# Patient Record
Sex: MALE | Race: WHITE | NOT HISPANIC OR LATINO | Employment: OTHER | RURAL
[De-identification: names, ages, dates, MRNs, and addresses within clinical notes are randomized per-mention and may not be internally consistent; named-entity substitution may affect disease eponyms.]

---

## 2020-09-09 ENCOUNTER — TRANSCRIBE ORDERS (OUTPATIENT)
Dept: PHYSICAL THERAPY | Facility: CLINIC | Age: 80
End: 2020-09-09

## 2020-09-09 DIAGNOSIS — M51.36 DDD (DEGENERATIVE DISC DISEASE), LUMBAR: ICD-10-CM

## 2020-09-09 DIAGNOSIS — M54.50 LOW BACK PAIN, UNSPECIFIED BACK PAIN LATERALITY, UNSPECIFIED CHRONICITY, UNSPECIFIED WHETHER SCIATICA PRESENT: Primary | ICD-10-CM

## 2020-09-10 ENCOUNTER — TREATMENT (OUTPATIENT)
Dept: PHYSICAL THERAPY | Facility: CLINIC | Age: 80
End: 2020-09-10

## 2020-09-10 DIAGNOSIS — M51.36 DDD (DEGENERATIVE DISC DISEASE), LUMBAR: Primary | ICD-10-CM

## 2020-09-10 PROCEDURE — 97161 PT EVAL LOW COMPLEX 20 MIN: CPT | Performed by: PHYSICAL THERAPIST

## 2020-09-10 PROCEDURE — 97110 THERAPEUTIC EXERCISES: CPT | Performed by: PHYSICAL THERAPIST

## 2020-09-10 PROCEDURE — 97140 MANUAL THERAPY 1/> REGIONS: CPT | Performed by: PHYSICAL THERAPIST

## 2020-09-10 NOTE — PROGRESS NOTES
Physical Therapy Initial Evaluation and Plan of Care        Patient: Amol Hudson   : 1940  Diagnosis/ICD-10 Code:  DDD (degenerative disc disease), lumbar [M51.36]  Referring practitioner: NAOMI Peterson  Date of Initial Visit: 9/10/2020  Today's Date: 9/10/2020  Patient seen for 1 sessions  REcert 10/1/20  MD PRN         Subjective Questionnaire: Oswestry:       Subjective Evaluation    History of Present Illness  Onset date: Greater than 10 years.    Subjective comment: Chronic long-term issues with chiropractic use.  Not staying stable after adjustments now.  Decreased fitness and core strength without fitness gym  Patient Occupation: Retired / golfing Quality of life: good    Pain  Current pain ratin  At best pain ratin  At worst pain ratin  Location: Right side low back  Quality: dull ache, tight and pulling  Relieving factors: rest  Aggravating factors: standing, prolonged positioning, ambulation and lifting  Progression: worsening    Social Support  Lives with: spouse    Hand dominance: right    Diagnostic Tests  X-ray: abnormal    Treatments  Previous treatment: chiropractic  Patient Goals  Patient goals for therapy: decreased pain, increased motion, increased strength and return to sport/leisure activities             Objective   Patient presents today with nonantalgic gait.  Lumbar flexion is 8 inches from the floor extension 30 degrees with pain in the right SI lumbar region.  Lateral flexion 2 inch deficit from the lateral joint lines  Manual muscle testing hip flexors 4+, abductors 4, quadriceps 5, hamstrings 5, plantar flexors dorsiflexors 5.  Sensation intact to crude light touch  Negative straight leg raise, positive Ynes's on the right, negative Buddy's  Is a right leg length discrepancy approximately 1/4-3/8 of an inch and posterior right sacral base and rotations of the lumbar's to the right.    No tenderness in the piriformis or sciatic notch noted.  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  Patient has SI dysfunction.  He would benefit from skilled therapy intervention for manual therapy home exercise program and core stabilization activities  Prognosis: good  Functional Limitations: lifting, uncomfortable because of pain, standing and stooping  Goals  Plan Goals: GOALS:  1. Lumbar flexion to the toes  2. Lumbar extension 35  evenly right and left  3. Lumbar SB 1\" right LJL  4. Lumbar SB 1\" left LJL  5. Pain   5 on numerical analogue scale or better in the mornings  6. Modified Oswestry  Less than 20%  7. Independent in HEP     Plan  Therapy options: will be seen for skilled physical therapy services  Planned modality interventions: cryotherapy  Planned therapy interventions: manual therapy, abdominal trunk stabilization, strengthening, stretching and home exercise program  Duration in visits: 12  Treatment plan discussed with: patient  Plan details: Manual therapy joint mobilization with muscle energy techniques to the SI and lumbar region.  Core stabilization strengthening of hips and lower extremity.  May use ice for inflammation      Visit Diagnoses:    ICD-10-CM ICD-9-CM   1. DDD (degenerative disc disease), lumbar M51.36 722.52       Timed:  Manual Therapy:    13     mins  80921;  Therapeutic Exercise:      17   mins  65986;     Neuromuscular Tarun:        mins  21999;    Therapeutic Activity:          mins  72387;     Gait Training:           mins  23239;     Ultrasound:          mins  97211;    Electrical Stimulation:         mins  52544 ( );    Untimed:  Electrical Stimulation:         mins  56787 ( );  Mechanical Traction:         mins  92320; "     Timed Treatment:   30   mins   Total Treatment:     30   mins    PT SIGNATURE: Nancy Barnes, JO DPT   DATE TREATMENT INITIATED: 9/10/2020    Initial Certification Period: 12/9/2020  I certify that the therapy services are furnished while this patient is under my care.  The services outlined above are required by this patient, and will be reviewed every 90 days.     PHYSICIAN: Jay Jay Beth, APRN      DATE:     Please sign and return via fax to [phone].. Thank you, [facility].

## 2020-09-14 ENCOUNTER — TREATMENT (OUTPATIENT)
Dept: PHYSICAL THERAPY | Facility: CLINIC | Age: 80
End: 2020-09-14

## 2020-09-14 DIAGNOSIS — M51.36 DDD (DEGENERATIVE DISC DISEASE), LUMBAR: Primary | ICD-10-CM

## 2020-09-14 PROCEDURE — 97110 THERAPEUTIC EXERCISES: CPT | Performed by: PHYSICAL THERAPIST

## 2020-09-14 PROCEDURE — 97140 MANUAL THERAPY 1/> REGIONS: CPT | Performed by: PHYSICAL THERAPIST

## 2020-09-14 NOTE — PROGRESS NOTES
"   Physical Therapy Daily Progress Note      Patient: Amol Hudson   : 1940  Referring practitioner: NAOMI Peterson  Date of Initial Visit: Type: THERAPY  Noted: 9/10/2020  Today's Date: 2020  Patient seen for 2 sessions  Recert 10/1  MD PALACIO     Amol Hudson reports: Pain is worse in the morning.  Better since eval visit last week. Exercises OK Pain 2/10 this  Morning now.  First up and out of bet is worse  Subjective Questionnaire: Oswestry: 22%      Subjective     Objective   See Exercise, Manual, and Modality Logs for complete treatment.   No LLD, decreased TTP of SI joint    Assessment/Plan  Goals  Plan Goals: GOALS:  1. Lumbar flexion to the toes  2. Lumbar extension 35  evenly right and left  3. Lumbar SB 1\" right LJL  4. Lumbar SB 1\" left LJL  5. Pain   5 on numerical analogue scale or better in the mornings  6. Modified Oswestry  Less than 20%  7. Independent in HEP   Visit Diagnoses:    ICD-10-CM ICD-9-CM   1. DDD (degenerative disc disease), lumbar  M51.36 722.52       Progress per Plan of Care and Progress strengthening /stabilization /functional activity           Timed:  Manual Therapy:    15     mins  32138;  Therapeutic Exercise:    32     mins  85716;     Neuromuscular Tarun:        mins  87530;    Therapeutic Activity:          mins  01927;     Gait Training:           mins  89564;     Ultrasound:          mins  21580;    Electrical Stimulation:         mins  04967 ( );    Untimed:  Electrical Stimulation:         mins  92671 ( );  Mechanical Traction:         mins  40806;     Timed Treatment: 47     mins   Total Treatment:    47   mins  Nancy Barnes, PT DPT  Physical Therapist                  "

## 2020-09-17 ENCOUNTER — TREATMENT (OUTPATIENT)
Dept: PHYSICAL THERAPY | Facility: CLINIC | Age: 80
End: 2020-09-17

## 2020-09-17 DIAGNOSIS — M51.36 DDD (DEGENERATIVE DISC DISEASE), LUMBAR: Primary | ICD-10-CM

## 2020-09-17 PROCEDURE — 97140 MANUAL THERAPY 1/> REGIONS: CPT | Performed by: PHYSICAL THERAPIST

## 2020-09-17 PROCEDURE — 97110 THERAPEUTIC EXERCISES: CPT | Performed by: PHYSICAL THERAPIST

## 2020-09-17 NOTE — PROGRESS NOTES
"   Physical Therapy Daily Progress Note      Patient: Amol Hudson   : 1940  Referring practitioner: NAOMI Peterson  Date of Initial Visit: Type: THERAPY  Noted: 9/10/2020  Today's Date: 2020  Patient seen for 3 sessions  REcert 10/1/20  MD PALACIO       Amol Hudson reports: Seems to get moving better in the  Mornings but still very tight and painful when first gets up. Rarely stretches before getting out of bed.  Not as much pain the day after golfing as there was  Subjective Questionnaire:       Subjective     Objective   See Exercise, Manual, and Modality Logs for complete treatment.   Tight hamstrings.    Assessment/Plan  Goals  Plan Goals: GOALS:  1. Lumbar flexion to the toes  2. Lumbar extension 35  evenly right and left  3. Lumbar SB 1\" right LJL  4. Lumbar SB 1\" left LJL  5. Pain   5 on numerical analogue scale or better in the mornings  6. Modified Oswestry  Less than 20%  7. Independent in HEP     Visit Diagnoses:    ICD-10-CM ICD-9-CM   1. DDD (degenerative disc disease), lumbar  M51.36 722.52       Progress per Plan of Care and Progress strengthening /stabilization /functional activity           Timed:  Manual Therapy:   12      mins  79535;  Therapeutic Exercise:    38     mins  36836;     Neuromuscular Tarun:        mins  75323;    Therapeutic Activity:          mins  15503;     Gait Training:           mins  15276;     Ultrasound:          mins  15462;    Electrical Stimulation:         mins  82884 ( );    Untimed:  Electrical Stimulation:         mins  28844 ( );  Mechanical Traction:         mins  02892;     Timed Treatment:   50   mins   Total Treatment:    50    mins  Nancy Barnes, PT DPT  Physical Therapist                  "

## 2020-09-22 ENCOUNTER — TREATMENT (OUTPATIENT)
Dept: PHYSICAL THERAPY | Facility: CLINIC | Age: 80
End: 2020-09-22

## 2020-09-22 DIAGNOSIS — M51.36 DDD (DEGENERATIVE DISC DISEASE), LUMBAR: Primary | ICD-10-CM

## 2020-09-22 PROCEDURE — 97140 MANUAL THERAPY 1/> REGIONS: CPT | Performed by: PHYSICAL THERAPIST

## 2020-09-22 PROCEDURE — 97110 THERAPEUTIC EXERCISES: CPT | Performed by: PHYSICAL THERAPIST

## 2020-09-22 NOTE — PROGRESS NOTES
"   Physical Therapy Daily Progress Note      Patient: Amol Hudson   : 1940  Referring practitioner: NAOMI Peterson  Date of Initial Visit: Type: THERAPY  Noted: 9/10/2020  Today's Date: 2020  Patient seen for 4 sessions  RECERT   MD PALACIO       Amol Hudson reports: 0 pain this morning. Much better transition getting up out of bed.  Overall 50% improved  Subjective Questionnaire:       Subjective     Objective   See Exercise, Manual, and Modality Logs for complete treatment.   Level sacral base and no LLD. Hamstrings still tight with seated knee extension.  Tight hip flexors. TTP right and left lumbar paraspinals  Assessment/Plan  Goals  Plan Goals: GOALS:  1. Lumbar flexion to the toes  2. Lumbar extension 35  evenly right and left  3. Lumbar SB 1\" right LJL  4. Lumbar SB 1\" left LJL  5. Pain   5 on numerical analogue scale or better in the mornings  6. Modified Oswestry  Less than 20%  7. Independent in HEP   Visit Diagnoses:    ICD-10-CM ICD-9-CM   1. DDD (degenerative disc disease), lumbar  M51.36 722.52     Pt states he is not available to schedule 10/1 due to other MD appointments and obligations  Progress per Plan of Care and Progress strengthening /stabilization /functional activity           Timed:  Manual Therapy: 10     mins  29732;  Therapeutic Exercise:    45   mins  44494;     Neuromuscular Tarun:        mins  22295;    Therapeutic Activity:          mins  80145;     Gait Training:           mins  62895;     Ultrasound:          mins  65439;    Electrical Stimulation:         mins  06457 ( );    Untimed:  Electrical Stimulation:         mins  58279 ( );  Mechanical Traction:         mins  47586;     Timed Treatment:  55    mins   Total Treatment:   55     mins  Nancy Barnes, PT DPT  Physical Therapist                  "

## 2020-09-24 ENCOUNTER — TREATMENT (OUTPATIENT)
Dept: PHYSICAL THERAPY | Facility: CLINIC | Age: 80
End: 2020-09-24

## 2020-09-24 DIAGNOSIS — M51.36 DDD (DEGENERATIVE DISC DISEASE), LUMBAR: Primary | ICD-10-CM

## 2020-09-24 PROCEDURE — 97110 THERAPEUTIC EXERCISES: CPT | Performed by: PHYSICAL THERAPIST

## 2020-09-24 PROCEDURE — 97140 MANUAL THERAPY 1/> REGIONS: CPT | Performed by: PHYSICAL THERAPIST

## 2020-09-24 NOTE — PROGRESS NOTES
"   Physical Therapy Daily Progress Note      Patient: Amol Hudson   : 1940  Referring practitioner: NAOMI Peterson  Date of Initial Visit: Type: THERAPY  Noted: 9/10/2020  Today's Date: 2020  Patient seen for 5 sessions  Recert   MD PALACIO       Amol Hudson reports: Rode in car a lot on Wednesday, sat around on couch a lot yesterday,  Did just a few stretches last night.  Sharp pain when getting up this morning.  Better now.  Subjective Questionnaire:       Subjective     Objective   See Exercise, Manual, and Modality Logs for complete treatment.   Level sacral base. TTP bilateral lumbar paraspinals    Assessment/Plan  Goals  Plan Goals: GOALS:  1. Lumbar flexion to the toes  2. Lumbar extension 35  evenly right and left  3. Lumbar SB 1\" right LJL  4. Lumbar SB 1\" left LJL  5. Pain   5 on numerical analogue scale or better in the mornings  6. Modified Oswestry  Less than 20%  7. Independent in HEP     Visit Diagnoses:    ICD-10-CM ICD-9-CM   1. DDD (degenerative disc disease), lumbar  M51.36 722.52       Progress strengthening /stabilization /functional activity           Timed:  Manual Therapy:    12     mins  41014;  Therapeutic Exercise:    35     mins  07483;     Neuromuscular Tarun:        mins  65225;    Therapeutic Activity:          mins  77308;     Gait Training:           mins  42037;     Ultrasound:          mins  17443;    Electrical Stimulation:         mins  83540 ( );    Untimed:  Electrical Stimulation:         mins  93397 ( );  Mechanical Traction:         mins  84255;     Timed Treatment: 47     mins   Total Treatment:      47  mins  Nancy Barnes, PT DPT  Physical Therapist                  "

## 2020-09-29 ENCOUNTER — TREATMENT (OUTPATIENT)
Dept: PHYSICAL THERAPY | Facility: CLINIC | Age: 80
End: 2020-09-29

## 2020-09-29 DIAGNOSIS — M51.36 DDD (DEGENERATIVE DISC DISEASE), LUMBAR: Primary | ICD-10-CM

## 2020-09-29 PROCEDURE — 97140 MANUAL THERAPY 1/> REGIONS: CPT | Performed by: PHYSICAL THERAPIST

## 2020-09-29 PROCEDURE — 97110 THERAPEUTIC EXERCISES: CPT | Performed by: PHYSICAL THERAPIST

## 2020-09-29 PROCEDURE — 97530 THERAPEUTIC ACTIVITIES: CPT | Performed by: PHYSICAL THERAPIST

## 2020-09-29 NOTE — PROGRESS NOTES
"Re-Assessment / Re-Certification        Patient: Amol Hudson   : 1940  Diagnosis/ICD-10 Code:  DDD (degenerative disc disease), lumbar [M51.36]  Referring practitioner: NAOMI Peterson  Date of Initial Visit: Type: THERAPY  Noted: 9/10/2020  Today's Date: 2020  Patient seen for 6 sessions  Recert 10/20  MD PRN    Subjective:   Amol Hudson reports: Better about 60%. Not as much morning pain 7/10 some mornings.  Subjective Questionnaire: Oswestry:  14% added 10/6/20  Clinical Progress: improved  Home Program Compliance: Yes  Treatment has included: therapeutic exercise, manual therapy and cryotherapy    Subjective   Objective   Lumbar flexion 2\" from toes, extension 35 with no SI blocking,  Lateral flexion 3\" from LJL  R with SI joint pain on the right, Left to LJL with no complaints of pain.  MMT hip flexion 5-/5. No LLD this date. TTP in lumbar paraspinals.  R sciatic notch/piriformis.  Normal gait pattern.   Assessment/Plan  Goals  Plan Goals: GOALS:  1. Lumbar flexion to the toes- Progressing  2. Lumbar extension 35  evenly right and left- met  3. Lumbar SB 1\" right LJL- progressing  4. Lumbar SB 1\" left LJL- MET  5. Pain   5 on numerical analogue scale or better in the mornings- NOT Met  6. Modified Oswestry  Less than 20%  7. Independent in HEP -progressing    Additional written HEP given this date  Visit Diagnoses:    ICD-10-CM ICD-9-CM   1. DDD (degenerative disc disease), lumbar  M51.36 722.52       Progress toward previous goals: Partially Met        Recommendations: Continue as planned  Timeframe: 3 weeks  Prognosis to achieve goals: good    PT Signature: Sarah Bowen, SUZIE Freed PT, ATC, DPT    Based upon review of the patient's progress and continued therapy plan, it is my medical opinion that Amol Hudson should continue physical therapy treatment at [facility]  [address] [facility] " [id number] [phone].    Signature: __________________________________  Jay Jay Beth APRN    Timed:  Manual Therapy:    10     mins  01276;  Therapeutic Exercise:  34       mins  12568;     Neuromuscular Tarun:        mins  90102;    Therapeutic Activity:    10      mins  93759;     Gait Training:           mins  90364;     Ultrasound:          mins  22005;    Electrical Stimulation:         mins  14593 ( );    Untimed:  Electrical Stimulation:         mins  08817 ( );  Mechanical Traction:         mins  79037;     Timed Treatment:  54    mins   Total Treatment:      54 mins

## 2020-10-06 ENCOUNTER — TREATMENT (OUTPATIENT)
Dept: PHYSICAL THERAPY | Facility: CLINIC | Age: 80
End: 2020-10-06

## 2020-10-06 DIAGNOSIS — M51.36 DDD (DEGENERATIVE DISC DISEASE), LUMBAR: Primary | ICD-10-CM

## 2020-10-06 PROCEDURE — 97110 THERAPEUTIC EXERCISES: CPT | Performed by: PHYSICAL THERAPIST

## 2020-10-06 NOTE — PROGRESS NOTES
"   Physical Therapy Daily Progress Note      Patient: Amol Hudson   : 1940  Referring practitioner: NAOMI Peterson  Date of Initial Visit: Type: THERAPY  Noted: 9/10/2020  Today's Date: 10/6/2020  Patient seen for 7 sessions    Recert due:  10-20-20  MD followup:  zayda Hudson reports:  60% improvement  Subjective Questionnaire:       Subjective   Reports his usual morning stiffness and pain.  Did weed eating yesterday.  Pre RX pain 4/10.     Objective   See Exercise, Manual, and Modality Logs for complete treatment.       Assessment & Plan     Assessment  Assessment details: Pt attempted obliques on ab crunch Cybex, but had pain increase and stopped that.  Tolerated all other therex well and reported pain decrease post RX.      Goals  Plan Goals: Plan Goals: GOALS:  1. Lumbar flexion to the toes- Progressing  2. Lumbar extension 35  evenly right and left- met  3. Lumbar SB 1\" right LJL- progressing  4. Lumbar SB 1\" left LJL- MET  5. Pain   5 on numerical analogue scale or better in the mornings- NOT Met  6. Modified Oswestry  Less than 20%  7. Independent in HEP -progressing    Plan  Plan details: Box lift with proper body mechanics        Visit Diagnoses:    ICD-10-CM ICD-9-CM   1. DDD (degenerative disc disease), lumbar  M51.36 722.52                  Timed:  Manual Therapy:         mins  06667;  Therapeutic Exercise:   42      mins  87120;     Neuromuscular Tarun:        mins  87751;    Therapeutic Activity:          mins  52538;     Gait Training:           mins  19588;     Ultrasound:          mins  21100;    Electrical Stimulation:         mins  47228 ( );    Untimed:  Electrical Stimulation:         mins  16229 ( );  Mechanical Traction:         mins  91084;     Timed Treatment:   42   mins   Total Treatment:    42    mins  Sarah Bowen PTA  Physical Therapist                  "

## 2020-10-08 ENCOUNTER — TREATMENT (OUTPATIENT)
Dept: PHYSICAL THERAPY | Facility: CLINIC | Age: 80
End: 2020-10-08

## 2020-10-08 DIAGNOSIS — M51.36 DDD (DEGENERATIVE DISC DISEASE), LUMBAR: Primary | ICD-10-CM

## 2020-10-08 PROCEDURE — 97110 THERAPEUTIC EXERCISES: CPT | Performed by: PHYSICAL THERAPIST

## 2020-10-08 PROCEDURE — 97530 THERAPEUTIC ACTIVITIES: CPT | Performed by: PHYSICAL THERAPIST

## 2020-10-08 PROCEDURE — 97140 MANUAL THERAPY 1/> REGIONS: CPT | Performed by: PHYSICAL THERAPIST

## 2020-10-08 NOTE — PROGRESS NOTES
"   Physical Therapy Daily Progress Note      Patient: Amol Hudson   : 1940  Referring practitioner: NAOMI Peterson  Date of Initial Visit: Type: THERAPY  Noted: 9/10/2020  Today's Date: 10/8/2020  Patient seen for 8 sessions  REassess: 10/20/20  MD PALACIO       Amol Hudson reports: Played golf yesterday and back is really sore with right shoulder pain like the bursitis he had before  Subjective Questionnaire:       Subjective     Objective   See Exercise, Manual, and Modality Logs for complete treatment.   SI  TTP on the right. Doing well with gait. Lumbar rotations to the right.  TTP in  Paraspinals in the lumbar and lower thoracic regions. Cavitation with  DAPHNE wing  Assessment/Plan  Goals  Plan Goals: Plan Goals: GOALS:  1. Lumbar flexion to the toes- Progressing  2. Lumbar extension 35  evenly right and left- met  3. Lumbar SB 1\" right LJL- progressing  4. Lumbar SB 1\" left LJL- MET  5. Pain   5 on numerical analogue scale or better in the mornings- NOT Met  6. Modified Oswestry  Less than 20%  7. Independent in HEP -progressing  Visit Diagnoses:    ICD-10-CM ICD-9-CM   1. DDD (degenerative disc disease), lumbar  M51.36 722.52       Progress per Plan of Care and Progress strengthening /stabilization /functional activity         Timed Treatment:  48    mins   Total Treatment:    48    mins  Nancy Barnes, PT DPT  Physical Therapist                  "

## 2020-10-13 ENCOUNTER — TREATMENT (OUTPATIENT)
Dept: PHYSICAL THERAPY | Facility: CLINIC | Age: 80
End: 2020-10-13

## 2020-10-13 DIAGNOSIS — M51.36 DDD (DEGENERATIVE DISC DISEASE), LUMBAR: Primary | ICD-10-CM

## 2020-10-13 PROCEDURE — 97110 THERAPEUTIC EXERCISES: CPT | Performed by: PHYSICAL THERAPIST

## 2020-10-13 PROCEDURE — 97530 THERAPEUTIC ACTIVITIES: CPT | Performed by: PHYSICAL THERAPIST

## 2020-10-13 PROCEDURE — 97140 MANUAL THERAPY 1/> REGIONS: CPT | Performed by: PHYSICAL THERAPIST

## 2020-10-13 NOTE — PROGRESS NOTES
"   Physical Therapy Daily Progress Note      Patient: Amol Hudson   : 1940  Referring practitioner: NAOMI Peterson  Date of Initial Visit: Type: THERAPY  Noted: 9/10/2020  Today's Date: 10/13/2020  Patient seen for 9 sessions  Reassess 10/20  MD PALACIO       Amol Hudson reports: doing OK today  Subjective Questionnaire:       Subjective     Objective   See Exercise, Manual, and Modality Logs for complete treatment.   Sit to stands, steps for function.    Assessment/Plan  Goals  Plan Goals: Plan Goals: GOALS:  1. Lumbar flexion to the toes- Progressing  2. Lumbar extension 35  evenly right and left- met  3. Lumbar SB 1\" right LJL- progressing  4. Lumbar SB 1\" left LJL- MET  5. Pain   5 on numerical analogue scale or better in the mornings- NOT Met  6. Modified Oswestry  Less than 20%  7. Independent in HEP -progressing    Visit Diagnoses:    ICD-10-CM ICD-9-CM   1. DDD (degenerative disc disease), lumbar  M51.36 722.52       Progress per Plan of Care and Progress strengthening /stabilization /functional activity           Timed:  Manual Therapy:    12     mins  69457;  Therapeutic Exercise:    18     mins  46281;     Neuromuscular Tarun:        mins  05961;    Therapeutic Activity:    12      mins  47637;     Gait Training:           mins  08142;     Ultrasound:          mins  42849;    Electrical Stimulation:         mins  53501 ( );    Untimed:  Electrical Stimulation:         mins  17031 ( );  Mechanical Traction:         mins  02899;     Timed Treatment:   42   mins   Total Treatment:   42     mins  Nancy Barnes, PT DPT  Physical Therapist                  "

## 2020-10-15 ENCOUNTER — TREATMENT (OUTPATIENT)
Dept: PHYSICAL THERAPY | Facility: CLINIC | Age: 80
End: 2020-10-15

## 2020-10-15 DIAGNOSIS — M51.36 DDD (DEGENERATIVE DISC DISEASE), LUMBAR: Primary | ICD-10-CM

## 2020-10-15 PROCEDURE — 97530 THERAPEUTIC ACTIVITIES: CPT | Performed by: PHYSICAL THERAPIST

## 2020-10-15 PROCEDURE — 97110 THERAPEUTIC EXERCISES: CPT | Performed by: PHYSICAL THERAPIST

## 2020-10-15 PROCEDURE — 97140 MANUAL THERAPY 1/> REGIONS: CPT | Performed by: PHYSICAL THERAPIST

## 2020-10-15 NOTE — PROGRESS NOTES
"   Physical Therapy Daily Progress Note      Patient: Amol Hudson   : 1940  Referring practitioner: NAOMI Peterson  Date of Initial Visit: Type: THERAPY  Noted: 9/10/2020  Today's Date: 10/15/2020  Patient seen for 10 sessions  recert  10/20  MD PALACIO       Amol Hudson reports: played golf yesterday, back is not too bad today  Subjective Questionnaire:       Subjective     Objective   See Exercise, Manual, and Modality Logs for complete treatment.       Assessment/Plan  Goals   GOALS:  1. Lumbar flexion to the toes- Progressing  2. Lumbar extension 35  evenly right and left- met  3. Lumbar SB 1\" right LJL- progressing  4. Lumbar SB 1\" left LJL- MET  5. Pain   5 on numerical analogue scale or better in the mornings- NOT Met  6. Modified Oswestry  Less than 20%  7. Independent in HEP -progressing     Visit Diagnoses:    ICD-10-CM ICD-9-CM   1. DDD (degenerative disc disease), lumbar  M51.36 722.52       Continue with core strength and manual therapy. REassess next week           Timed:  Manual Therapy:    15   mins  48017;  Therapeutic Exercise:   30      mins  88661;     Neuromuscular Tarun:        mins  75860;    Therapeutic Activity:     10     mins  69475;     Gait Training:           mins  49190;     Ultrasound:          mins  73670;    Electrical Stimulation:         mins  61327 ( );    Untimed:  Electrical Stimulation:         mins  89800 ( );  Mechanical Traction:        mins  24512;     Timed Treatment:    55mins   Total Treatment:    55    mins  Nancy Barnes, PT DPT  Physical Therapist                  "

## 2020-10-20 ENCOUNTER — TREATMENT (OUTPATIENT)
Dept: PHYSICAL THERAPY | Facility: CLINIC | Age: 80
End: 2020-10-20

## 2020-10-20 DIAGNOSIS — M51.36 DDD (DEGENERATIVE DISC DISEASE), LUMBAR: Primary | ICD-10-CM

## 2020-10-20 PROCEDURE — 97140 MANUAL THERAPY 1/> REGIONS: CPT | Performed by: PHYSICAL THERAPIST

## 2020-10-20 PROCEDURE — 97112 NEUROMUSCULAR REEDUCATION: CPT | Performed by: PHYSICAL THERAPIST

## 2020-10-20 PROCEDURE — 97110 THERAPEUTIC EXERCISES: CPT | Performed by: PHYSICAL THERAPIST

## 2020-10-20 NOTE — PROGRESS NOTES
"   Physical Therapy Daily Progress Note      Patient: Amol Hudson   : 1940  Referring practitioner: NAOMI Peterson  Date of Initial Visit: Type: THERAPY  Noted: 9/10/2020  Today's Date: 10/20/2020  Patient seen for 11 sessions    recert  10/20  MD PALACIO       Amol Hudson reports: 60% improvement        Subjective Evaluation    History of Present Illness    Subjective comment: pt states that he has no pain in his back today. States that he is going to see his PCP about his knee and his shoulder today.Pain  Current pain ratin           Objective          Postural Observations    Additional Postural Observation Details  R ASIS inferior to L. Corrected with R hip flexor/ L HS MET      See Exercise, Manual, and Modality Logs for complete treatment.       Assessment & Plan     Assessment  Assessment details: Pt does well with treatment. Minimal cues for cybex setup/ completion. Pt did require some cues for PN on pball and for not over extending leg on multifidus step ups. Good response to manual. Alignment corrected easily with MET.    Goals  Plan Goals: GOALS:  1. Lumbar flexion to the toes- Progressing  2. Lumbar extension 35  evenly right and left- met  3. Lumbar SB 1\" right LJL- progressing  4. Lumbar SB 1\" left LJL- MET  5. Pain   5 on numerical analogue scale or better in the mornings- NOT Met  6. Modified Oswestry  Less than 20%  7. Independent in HEP -progressing      Plan  Plan details: Possibly work on QP bird dogs. Modified planks. Recheck next visit.        Visit Diagnoses:    ICD-10-CM ICD-9-CM   1. DDD (degenerative disc disease), lumbar  M51.36 722.52       Progress per Plan of Care and Progress strengthening /stabilization /functional activity           Timed:  Manual Therapy:    10     mins  65670;  Therapeutic Exercise:    30     mins  90748;     Neuromuscular Tarun:    15    mins  43870;    Therapeutic Activity:          mins  62419;     Gait Training:           mins  78546;   "   Ultrasound:          mins  23230;    Electrical Stimulation:         mins  58630 ( );    Untimed:  Electrical Stimulation:         mins  44371 ( );  Mechanical Traction:         mins  09586;     Timed Treatment:   55   mins   Total Treatment:     55   mins  Norma Atkinson [facility]  Physical Therapist

## 2020-10-22 ENCOUNTER — TREATMENT (OUTPATIENT)
Dept: PHYSICAL THERAPY | Facility: CLINIC | Age: 80
End: 2020-10-22

## 2020-10-22 DIAGNOSIS — M51.36 DDD (DEGENERATIVE DISC DISEASE), LUMBAR: Primary | ICD-10-CM

## 2020-10-22 PROCEDURE — 97140 MANUAL THERAPY 1/> REGIONS: CPT | Performed by: PHYSICAL THERAPIST

## 2020-10-22 PROCEDURE — 97110 THERAPEUTIC EXERCISES: CPT | Performed by: PHYSICAL THERAPIST

## 2020-10-22 NOTE — PROGRESS NOTES
"Re-Assessment / Re-Certification        Patient: Amol Hudson   : 1940  Diagnosis/ICD-10 Code:  DDD (degenerative disc disease), lumbar [M51.36]  Referring practitioner: NAOMI Peterson  Date of Initial Visit: Type: THERAPY  Noted: 9/10/2020  Today's Date: 10/22/2020  Patient seen for 12 sessions  Reassessment  20  MD     Subjective:   Amol Hudson reports: Pt states 60% improved pain this morning 5/10 after playing golf yesterday  Subjective Questionnaire:   Clinical Progress: improved  Home Program Compliance: Yes  Treatment has included: therapeutic exercise, manual therapy, therapeutic activity and cryotherapy    Subjective   Objective   Lumbar flexion 4\" from toes, extension 30 with no SI blocking,  Lateral flexion 2\" from LJL  R Right lumbar paraspinal tenderness.  MMT hip flexion /5. No LLD this date. TTP  R sciatic notch/piriformis.  Normal gait pattern. Rounder shoulders with increased thoracic kyphosis  Assessment/Plan  Plan Goals: GOALS:  1. Lumbar flexion to the toes- ongoing  2. Lumbar extension 35  evenly right and left- unmet  3. Lumbar SB 1\" right LJL- progressing  4. Lumbar SB 1\" left LJL- MET  5. Pain   5 on numerical analogue scale or better in the mornings- Not Met  6. Modified Oswestry  Less than 20%  7. Independent in HEP -progressing    Visit Diagnoses:    ICD-10-CM ICD-9-CM   1. DDD (degenerative disc disease), lumbar  M51.36 722.52       Progress toward previous goals: Partially Met    New Goals        Recommendations: Continue as planned, mid backstretch, angry cat stretch. MS on Air x, tandem balance beam walk.  Timeframe: 2 weeks  Prognosis to achieve goals: good    PT Signature: Nancy Barnes, PT DPT      Based upon review of the patient's progress and continued therapy plan, it is my medical opinion that Amol Hudson should continue physical therapy treatment at [facility] RENAE BROWNLEE  [facility]  [address] [facility] [address]ANNON BROWNLEE " [facility] [id number]  [phone].    Signature: __________________________________  Jay Jay Beth APRN    Timed:  Manual Therapy:  10       mins  93950;  Therapeutic Exercise:   33      mins  44443;     Neuromuscular Tarun:        mins  78582;    Therapeutic Activity:          mins  28263;     Gait Training:           mins  79824;     Ultrasound:          mins  21198;    Electrical Stimulation:         mins  94301 ( );    Untimed:  Electrical Stimulation:         mins  87917 ( );  Mechanical Traction:         mins  34660;     Timed Treatment:    43  mins   Total Treatment:        43mins

## 2020-10-26 ENCOUNTER — TREATMENT (OUTPATIENT)
Dept: PHYSICAL THERAPY | Facility: CLINIC | Age: 80
End: 2020-10-26

## 2020-10-26 DIAGNOSIS — M51.36 DDD (DEGENERATIVE DISC DISEASE), LUMBAR: Primary | ICD-10-CM

## 2020-10-26 PROCEDURE — 97110 THERAPEUTIC EXERCISES: CPT | Performed by: PHYSICAL THERAPIST

## 2020-10-26 NOTE — PROGRESS NOTES
"   Physical Therapy Daily Progress Note      Patient: Amol Hudson   : 1940  Referring practitioner: NAOMI Peterson  Date of Initial Visit: Type: THERAPY  Noted: 9/10/2020  Today's Date: 10/26/2020  Patient seen for 13 sessions    Recert due:  20  MD followup:  20     Amol Hudson reports: 60% improvement  Subjective Questionnaire:       Subjective  Patient c/o pain in R SI joint.  R shoulder and R knee also hurting today.  Pre RX pain 4/10 at R SI.      Objective  R innominate anteriorly rotated.    See Exercise, Manual, and Modality Logs for complete treatment.       Assessment & Plan     Assessment  Assessment details: Pt having some R shoulder issues so Cybex rows and CC rotation held for treatment.  Alignment corrected with MET.  Pt doing well with current core stabilization exercises.     Goals  Plan Goals: 1. Lumbar flexion to the toes- ongoing  2. Lumbar extension 35  evenly right and left- unmet  3. Lumbar SB 1\" right LJL- progressing  4. Lumbar SB 1\" left LJL- MET  5. Pain   5 on numerical analogue scale or better in the mornings- Not Met  6. Modified Oswestry  Less than 20%  7. Independent in HEP -progressing       Plan  Plan details: Monitor pelvic alignment        Visit Diagnoses:    ICD-10-CM ICD-9-CM   1. DDD (degenerative disc disease), lumbar  M51.36 722.52                  Timed:  Manual Therapy:         mins  76558;  Therapeutic Exercise:    45     mins  80156;     Neuromuscular Tarun:        mins  15510;    Therapeutic Activity:          mins  40266;     Gait Training:           mins  12025;     Ultrasound:         mins  40055;    Electrical Stimulation:         mins  46739 ( );    Untimed:  Electrical Stimulation:         mins  07593 ( );  Mechanical Traction:        mins  30939;     Timed Treatment:  45    mins   Total Treatment:    45    mins  Sarah Bowen PTA  Physical Therapist                  "

## 2020-10-29 ENCOUNTER — TREATMENT (OUTPATIENT)
Dept: PHYSICAL THERAPY | Facility: CLINIC | Age: 80
End: 2020-10-29

## 2020-10-29 DIAGNOSIS — M51.36 DDD (DEGENERATIVE DISC DISEASE), LUMBAR: Primary | ICD-10-CM

## 2020-10-29 PROCEDURE — 97530 THERAPEUTIC ACTIVITIES: CPT | Performed by: PHYSICAL THERAPIST

## 2020-10-29 PROCEDURE — 97140 MANUAL THERAPY 1/> REGIONS: CPT | Performed by: PHYSICAL THERAPIST

## 2020-10-29 PROCEDURE — 97110 THERAPEUTIC EXERCISES: CPT | Performed by: PHYSICAL THERAPIST

## 2020-10-29 NOTE — PROGRESS NOTES
"   Physical Therapy Daily Progress Note      Patient: Amol Hudson   : 1940  Referring practitioner: NAOMI Peterson  Date of Initial Visit: Type: THERAPY  Noted: 9/10/2020  Today's Date: 10/29/2020  Patient seen for 14 sessions  Reassessment   MD 11/10 or        Amol Hudson reports: Shoulder bothers him more than the back today.   Subjective Questionnaire:       Subjective     Objective   See Exercise, Manual, and Modality Logs for complete treatment.   Obvious restricted R shoulder ROM    Assessment/Plan  Plan Goals: GOALS:  1. Lumbar flexion to the toes- ongoing  2. Lumbar extension 35  evenly right and left- unmet  3. Lumbar SB 1\" right LJL- progressing  4. Lumbar SB 1\" left LJL- MET  5. Pain   5 on numerical analogue scale or better in the mornings-  Met  6. Modified Oswestry  Less than 20%  7. Independent in HEP -MET    Visit Diagnoses:    ICD-10-CM ICD-9-CM   1. DDD (degenerative disc disease), lumbar  M51.36 722.52       Progress per Plan of Care and Progress strengthening /stabilization /functional activity  Functional activities/lifting-carrying         Timed:  Manual Therapy:   17      mins  13407;  Therapeutic Exercise:    30     mins  97404;     Neuromuscular Tarun:        mins  55728;    Therapeutic Activity:    10      mins  68835;     Gait Training:           mins  11900;     Ultrasound:          mins  15433;    Electrical Stimulation:         mins  33436 ( );    Untimed:  Electrical Stimulation:         mins  96325 ( );  Mechanical Traction:         mins  83492;     Timed Treatment: 57     mins   Total Treatment:     57   mins  Nancy Barnes, PT DPT  Physical Therapist                  "

## 2020-11-02 ENCOUNTER — TREATMENT (OUTPATIENT)
Dept: PHYSICAL THERAPY | Facility: CLINIC | Age: 80
End: 2020-11-02

## 2020-11-02 DIAGNOSIS — M51.36 DDD (DEGENERATIVE DISC DISEASE), LUMBAR: Primary | ICD-10-CM

## 2020-11-02 PROCEDURE — 97110 THERAPEUTIC EXERCISES: CPT | Performed by: PHYSICAL THERAPIST

## 2020-11-02 NOTE — PROGRESS NOTES
"   Physical Therapy Daily Progress Note      Patient: Amol Hudson   : 1940  Referring practitioner: NAOMI Peterson  Date of Initial Visit: Type: THERAPY  Noted: 9/10/2020  Today's Date: 2020  Patient seen for 15 sessions    Recert due:  20  MD followup:  11-10-20 or 20     Amol Hudson reports:  90% improvement  Subjective Questionnaire:       Subjective  Pt reports back doing better.  He has been doing a lot of walking and deer hunting without difficulty.  Pre RX pain /10.     Objective  Slight posterior pelvic tilt with physioball activities.     See Exercise, Manual, and Modality Logs for complete treatment.       Assessment & Plan     Assessment  Assessment details: Pt reporting pain first thing in a.m. that gets better when he stretches and gets moving.  Doing better with functional activities.     Goals  Plan Goals: Plan Goals: GOALS:  1. Lumbar flexion to the toes- ongoing  2. Lumbar extension 35  evenly right and left- unmet  3. Lumbar SB 1\" right LJL- progressing  4. Lumbar SB 1\" left LJL- MET  5. Pain   5 on numerical analogue scale or better in the mornings-  Met  6. Modified Oswestry  Less than 20%  7. Independent in HEP -MET    Plan  Plan details: physioball trunk rotation T Band        Visit Diagnoses:    ICD-10-CM ICD-9-CM   1. DDD (degenerative disc disease), lumbar  M51.36 722.52                  Timed:  Manual Therapy:         mins  65769;  Therapeutic Exercise:  45       mins  22470;     Neuromuscular Tarun:        mins  65109;    Therapeutic Activity:          mins  32916;     Gait Training:           mins  08222;     Ultrasound:          mins  47485;    Electrical Stimulation:         mins  94966 ( );    Untimed:  Electrical Stimulation:         mins  57599 ( );  Mechanical Traction:         mins  11280;     Timed Treatment   45    mins   Total Treatment:    45     mins  Sarah Bowen PTA  Physical Therapist                  "

## 2020-11-05 ENCOUNTER — TREATMENT (OUTPATIENT)
Dept: PHYSICAL THERAPY | Facility: CLINIC | Age: 80
End: 2020-11-05

## 2020-11-05 DIAGNOSIS — M51.36 DDD (DEGENERATIVE DISC DISEASE), LUMBAR: Primary | ICD-10-CM

## 2020-11-05 PROCEDURE — 97110 THERAPEUTIC EXERCISES: CPT | Performed by: PHYSICAL THERAPIST

## 2020-11-05 NOTE — PROGRESS NOTES
"   Physical Therapy Daily Progress Note      Patient: Amol Hudson   : 1940  Referring practitioner: NAOMI Peterson  Date of Initial Visit: Type: THERAPY  Noted: 9/10/2020  Today's Date: 2020  Patient seen for 16 sessions    Recert due:  20  MD followup:  11-10-20 or 20     Amol Hudson reports:  90% improvement  Subjective Questionnaire:       Subjective   Pt reports R hip hurting worse than his back today.  Pre RX back 2/10; R hip /10    Objective  TTP R SI    See Exercise, Manual, and Modality Logs for complete treatment.       Assessment & Plan     Assessment  Assessment details: Pt displays fair balance and core stability on physioball.  Good tolerance for Cybex exercises.  No reported pain increase during therex.     Goals  Plan Goals: Plan Goals: Plan Goals: GOALS:  1. Lumbar flexion to the toes- ongoing  2. Lumbar extension 35  evenly right and left- unmet  3. Lumbar SB 1\" right LJL- progressing  4. Lumbar SB 1\" left LJL- MET  5. Pain   5 on numerical analogue scale or better in the mornings-  Met  6. Modified Oswestry  Less than 20%  7. Independent in HEP -MET    Plan  Plan details: Cont 1 more week and D/C to self management.         Visit Diagnoses:    ICD-10-CM ICD-9-CM   1. DDD (degenerative disc disease), lumbar  M51.36 722.52                  Timed:  Manual Therapy:         mins  09740;  Therapeutic Exercise:   45      mins  43631;     Neuromuscular Tarun:        mins  07068;    Therapeutic Activity:          mins  66419;     Gait Training:           mins  49929;     Ultrasound:          mins  92004;    Electrical Stimulation:         mins  04543 ( );    Untimed:  Electrical Stimulation:         mins  79928 ( );  Mechanical Traction:         mins  23507;     Timed Treatment:  45    mins   Total Treatment:    45    mins  Sarah Bowen PTA  Physical Therapist                  "

## 2020-11-10 ENCOUNTER — TREATMENT (OUTPATIENT)
Dept: PHYSICAL THERAPY | Facility: CLINIC | Age: 80
End: 2020-11-10

## 2020-11-10 DIAGNOSIS — M51.36 DDD (DEGENERATIVE DISC DISEASE), LUMBAR: Primary | ICD-10-CM

## 2020-11-10 PROCEDURE — 97110 THERAPEUTIC EXERCISES: CPT | Performed by: PHYSICAL THERAPIST

## 2020-11-10 NOTE — PROGRESS NOTES
"   Physical Therapy Daily Progress Note/ Discharge Summary      Patient: Amol Hudson   : 1940  Referring practitioner: NAOMI Peterson  Date of Initial Visit: Type: THERAPY  Noted: 9/10/2020  Today's Date: 11/10/2020  Patient seen for 17 sessions    Recert due:  DAPHNEY BEST followup:  20     Amol Hudson reports: 95% improvement  Subjective Questionnaire:       Subjective  Pt reports that he wants to finalize his therapy today and cancel his last appt scheduled on 20.  Pre RX pain R hip 2/10.     Objective          Active Range of Motion     Lumbar   Flexion: Active lumbar flexion: 3\" from floor.   Extension: 40 degrees   Left lateral flexion: Active left lumbar lateral flexion: 2\" below knee LJL.   Right lateral flexion: Active right lumbar lateral flexion: 2\" below knee LJL.         See Exercise, Manual, and Modality Logs for complete treatment.       Assessment & Plan     Assessment  Assessment details: Pt met 6/7 goals.  Has extensive HEP established.      Goals  Plan Goals: 1. Lumbar flexion to the toes- Not met (3\" from floor)  2. Lumbar extension 35  evenly right and left-  MET  3. Lumbar SB 1\" right LJL- MET  4. Lumbar SB 1\" left LJL- MET  5. Pain   5 on numerical analogue scale or better in the mornings-  Met  6. Modified Oswestry  Less than 20%  MET  7. Independent in HEP -MET    Plan  Plan details: D/C to independent self management with HEP.           Visit Diagnoses:    ICD-10-CM ICD-9-CM   1. DDD (degenerative disc disease), lumbar  M51.36 722.52                  Timed:  Manual Therapy:         mins  06441;  Therapeutic Exercise:   49     mins  30328;     Neuromuscular Tarun:        mins  63223;    Therapeutic Activity:          mins  51899;     Gait Training:           mins  50259;     Ultrasound:          mins  91430;    Electrical Stimulation:         mins  60932 ( );    Untimed:  Electrical Stimulation:         mins  35564 ( );  Mechanical Traction:         mins  " 86144;     Timed Treatment: 49    mins   Total Treatment:    49    mins  Sarah Bowen, PTA  Physical Therapist Assistant  Nancy Freed, PT, MPT  Physical Therapist

## 2021-01-21 ENCOUNTER — IMMUNIZATION (OUTPATIENT)
Dept: VACCINE CLINIC | Facility: HOSPITAL | Age: 81
End: 2021-01-21

## 2021-01-21 PROCEDURE — 0001A: CPT | Performed by: THORACIC SURGERY (CARDIOTHORACIC VASCULAR SURGERY)

## 2021-01-21 PROCEDURE — 91300 HC SARSCOV02 VAC 30MCG/0.3ML IM: CPT | Performed by: THORACIC SURGERY (CARDIOTHORACIC VASCULAR SURGERY)

## 2021-01-27 ENCOUNTER — TRANSCRIBE ORDERS (OUTPATIENT)
Dept: ORTHOPEDIC SURGERY | Facility: CLINIC | Age: 81
End: 2021-01-27

## 2021-01-27 DIAGNOSIS — G56.02 LEFT CARPAL TUNNEL SYNDROME: Primary | ICD-10-CM

## 2021-02-08 DIAGNOSIS — M25.532 LEFT WRIST PAIN: Primary | ICD-10-CM

## 2021-02-11 ENCOUNTER — APPOINTMENT (OUTPATIENT)
Dept: VACCINE CLINIC | Facility: HOSPITAL | Age: 81
End: 2021-02-11

## 2021-02-24 ENCOUNTER — IMMUNIZATION (OUTPATIENT)
Dept: VACCINE CLINIC | Facility: HOSPITAL | Age: 81
End: 2021-02-24

## 2021-02-24 PROCEDURE — 0002A: CPT | Performed by: THORACIC SURGERY (CARDIOTHORACIC VASCULAR SURGERY)

## 2021-02-24 PROCEDURE — 91300 HC SARSCOV02 VAC 30MCG/0.3ML IM: CPT | Performed by: THORACIC SURGERY (CARDIOTHORACIC VASCULAR SURGERY)

## 2021-08-18 DIAGNOSIS — R07.9 CHEST PAIN, UNSPECIFIED TYPE: Primary | ICD-10-CM

## 2021-09-23 DIAGNOSIS — R07.9 CHEST PAIN, UNSPECIFIED TYPE: Primary | ICD-10-CM

## 2021-09-24 ENCOUNTER — HOSPITAL ENCOUNTER (OUTPATIENT)
Dept: CARDIOLOGY | Facility: HOSPITAL | Age: 81
Discharge: HOME | End: 2021-09-24

## 2021-09-27 DIAGNOSIS — R07.9 CHEST PAIN, UNSPECIFIED TYPE: Primary | ICD-10-CM

## 2021-09-30 ENCOUNTER — HOSPITAL ENCOUNTER (OUTPATIENT)
Dept: NUCLEAR MEDICINE | Facility: HOSPITAL | Age: 81
Discharge: HOME | End: 2021-09-30

## 2021-09-30 ENCOUNTER — HOSPITAL ENCOUNTER (OUTPATIENT)
Dept: CARDIOLOGY | Facility: HOSPITAL | Age: 81
Discharge: HOME | End: 2021-09-30

## 2021-09-30 DIAGNOSIS — Z98.61 HISTORY OF PTCA: ICD-10-CM

## 2021-09-30 DIAGNOSIS — R07.9 CHEST PAIN, UNSPECIFIED TYPE: ICD-10-CM

## 2021-09-30 DIAGNOSIS — I25.10 ATHEROSCLEROSIS OF NATIVE CORONARY ARTERY OF NATIVE HEART WITHOUT ANGINA PECTORIS: ICD-10-CM

## 2021-09-30 PROCEDURE — 0 TECHNETIUM SESTAMIBI: Performed by: INTERNAL MEDICINE

## 2021-09-30 PROCEDURE — A9500 TC99M SESTAMIBI: HCPCS | Performed by: INTERNAL MEDICINE

## 2021-09-30 PROCEDURE — 78452 HT MUSCLE IMAGE SPECT MULT: CPT

## 2021-09-30 PROCEDURE — 93017 CV STRESS TEST TRACING ONLY: CPT

## 2021-09-30 RX ORDER — ASPIRIN 81 MG/1
81 TABLET, CHEWABLE ORAL DAILY
COMMUNITY

## 2021-09-30 RX ORDER — ATORVASTATIN CALCIUM 80 MG/1
80 TABLET, FILM COATED ORAL DAILY
COMMUNITY

## 2021-09-30 RX ORDER — MULTIPLE VITAMINS W/ MINERALS TAB 9MG-400MCG
1 TAB ORAL DAILY
COMMUNITY

## 2021-09-30 RX ORDER — LISINOPRIL 5 MG/1
5 TABLET ORAL DAILY
COMMUNITY

## 2021-09-30 RX ORDER — TAMSULOSIN HYDROCHLORIDE 0.4 MG/1
1 CAPSULE ORAL DAILY
COMMUNITY

## 2021-09-30 RX ORDER — GABAPENTIN 300 MG/1
300 CAPSULE ORAL DAILY
COMMUNITY

## 2021-09-30 RX ORDER — OMEPRAZOLE 20 MG/1
20 CAPSULE, DELAYED RELEASE ORAL DAILY
COMMUNITY

## 2021-09-30 RX ORDER — LEVOTHYROXINE SODIUM 0.05 MG/1
50 TABLET ORAL DAILY
COMMUNITY

## 2021-09-30 RX ADMIN — TECHNETIUM TC 99M SESTAMIBI 1 DOSE: 1 INJECTION INTRAVENOUS at 07:56

## 2021-09-30 RX ADMIN — TECHNETIUM TC 99M SESTAMIBI 1 DOSE: 1 INJECTION INTRAVENOUS at 09:02

## 2021-10-01 ENCOUNTER — PREP FOR SURGERY (OUTPATIENT)
Dept: OTHER | Facility: HOSPITAL | Age: 81
End: 2021-10-01

## 2021-10-01 DIAGNOSIS — Z98.61 HISTORY OF PTCA: Primary | ICD-10-CM

## 2021-10-01 RX ORDER — SODIUM CHLORIDE 0.9 % (FLUSH) 0.9 %
3 SYRINGE (ML) INJECTION EVERY 12 HOURS SCHEDULED
Status: CANCELLED | OUTPATIENT
Start: 2021-11-02

## 2021-10-01 RX ORDER — SODIUM CHLORIDE 0.9 % (FLUSH) 0.9 %
10 SYRINGE (ML) INJECTION AS NEEDED
Status: CANCELLED | OUTPATIENT
Start: 2021-11-02

## 2021-10-01 RX ORDER — SODIUM CHLORIDE 9 MG/ML
75 INJECTION, SOLUTION INTRAVENOUS CONTINUOUS
Status: CANCELLED | OUTPATIENT
Start: 2021-11-02

## 2021-10-03 LAB
BH CV REST NUCLEAR ISOTOPE DOSE: 10.4 MCI
BH CV STRESS BP STAGE 1: NORMAL
BH CV STRESS BP STAGE 2: NORMAL
BH CV STRESS DURATION MIN STAGE 1: 3
BH CV STRESS DURATION MIN STAGE 2: 1
BH CV STRESS DURATION SEC STAGE 1: 0
BH CV STRESS DURATION SEC STAGE 2: 44
BH CV STRESS GRADE STAGE 1: 10
BH CV STRESS GRADE STAGE 2: 12
BH CV STRESS HR STAGE 1: 115
BH CV STRESS HR STAGE 2: 131
BH CV STRESS METS STAGE 1: 5
BH CV STRESS METS STAGE 2: 7.5
BH CV STRESS NUCLEAR ISOTOPE DOSE: 38 MCI
BH CV STRESS PROTOCOL 1: NORMAL
BH CV STRESS RECOVERY BP: NORMAL MMHG
BH CV STRESS RECOVERY HR: 68 BPM
BH CV STRESS SPEED STAGE 1: 1.7
BH CV STRESS SPEED STAGE 2: 2.5
BH CV STRESS STAGE 1: 1
BH CV STRESS STAGE 2: 2
LV EF NUC BP: 81 %
MAXIMAL PREDICTED HEART RATE: 139 BPM
PERCENT MAX PREDICTED HR: 95.68 %
STRESS BASELINE BP: NORMAL MMHG
STRESS BASELINE HR: 55 BPM
STRESS PERCENT HR: 113 %
STRESS POST ESTIMATED WORKLOAD: 8 METS
STRESS POST EXERCISE DUR MIN: 4 MIN
STRESS POST EXERCISE DUR SEC: 44 SEC
STRESS POST PEAK BP: NORMAL MMHG
STRESS POST PEAK HR: 133 BPM
STRESS TARGET HR: 118 BPM

## 2021-11-01 PROBLEM — Z98.61 HISTORY OF PTCA: Status: ACTIVE | Noted: 2021-11-01

## 2021-11-02 ENCOUNTER — HOSPITAL ENCOUNTER (OUTPATIENT)
Facility: HOSPITAL | Age: 81
Setting detail: HOSPITAL OUTPATIENT SURGERY
Discharge: HOME | End: 2021-11-02
Attending: INTERNAL MEDICINE | Admitting: INTERNAL MEDICINE

## 2021-11-02 VITALS
WEIGHT: 181.66 LBS | TEMPERATURE: 97 F | RESPIRATION RATE: 18 BRPM | SYSTOLIC BLOOD PRESSURE: 136 MMHG | DIASTOLIC BLOOD PRESSURE: 60 MMHG | BODY MASS INDEX: 26.01 KG/M2 | HEART RATE: 57 BPM | OXYGEN SATURATION: 97 % | HEIGHT: 70 IN

## 2021-11-02 DIAGNOSIS — Z98.61 HISTORY OF PTCA: Primary | ICD-10-CM

## 2021-11-02 LAB
ANION GAP SERPL CALCULATED.3IONS-SCNC: 8 MMOL/L (ref 5–15)
BASOPHILS # BLD AUTO: 0.06 10*3/MM3 (ref 0–0.2)
BASOPHILS NFR BLD AUTO: 0.8 % (ref 0–1.5)
BUN SERPL-MCNC: 19 MG/DL (ref 8–23)
BUN/CREAT SERPL: 19.4 (ref 7–25)
CALCIUM SPEC-SCNC: 9.6 MG/DL (ref 8.6–10.5)
CHLORIDE SERPL-SCNC: 104 MMOL/L (ref 98–107)
CO2 SERPL-SCNC: 27 MMOL/L (ref 22–29)
CREAT SERPL-MCNC: 0.98 MG/DL (ref 0.76–1.27)
DEPRECATED RDW RBC AUTO: 44.4 FL (ref 37–54)
EOSINOPHIL # BLD AUTO: 0.35 10*3/MM3 (ref 0–0.4)
EOSINOPHIL NFR BLD AUTO: 4.8 % (ref 0.3–6.2)
ERYTHROCYTE [DISTWIDTH] IN BLOOD BY AUTOMATED COUNT: 13.7 % (ref 12.3–15.4)
GFR SERPL CREATININE-BSD FRML MDRD: 73 ML/MIN/1.73
GLUCOSE SERPL-MCNC: 123 MG/DL (ref 65–99)
HCT VFR BLD AUTO: 37.5 % (ref 37.5–51)
HGB BLD-MCNC: 13 G/DL (ref 13–17.7)
HOLD SPECIMEN: NORMAL
IMM GRANULOCYTES # BLD AUTO: 0.03 10*3/MM3 (ref 0–0.05)
IMM GRANULOCYTES NFR BLD AUTO: 0.4 % (ref 0–0.5)
INR PPP: 0.95 (ref 0.8–1.2)
LYMPHOCYTES # BLD AUTO: 2.37 10*3/MM3 (ref 0.7–3.1)
LYMPHOCYTES NFR BLD AUTO: 32.2 % (ref 19.6–45.3)
MCH RBC QN AUTO: 31.9 PG (ref 26.6–33)
MCHC RBC AUTO-ENTMCNC: 34.7 G/DL (ref 31.5–35.7)
MCV RBC AUTO: 91.9 FL (ref 79–97)
MONOCYTES # BLD AUTO: 0.78 10*3/MM3 (ref 0.1–0.9)
MONOCYTES NFR BLD AUTO: 10.6 % (ref 5–12)
NEUTROPHILS NFR BLD AUTO: 3.76 10*3/MM3 (ref 1.7–7)
NEUTROPHILS NFR BLD AUTO: 51.2 % (ref 42.7–76)
NRBC BLD AUTO-RTO: 0 /100 WBC (ref 0–0.2)
PLATELET # BLD AUTO: 99 10*3/MM3 (ref 140–450)
PMV BLD AUTO: 13.6 FL (ref 6–12)
POTASSIUM SERPL-SCNC: 4 MMOL/L (ref 3.5–5.2)
PROTHROMBIN TIME: 12.6 SECONDS (ref 11.1–15.3)
RBC # BLD AUTO: 4.08 10*6/MM3 (ref 4.14–5.8)
RBC MORPH BLD: NORMAL
SMALL PLATELETS BLD QL SMEAR: NORMAL
SODIUM SERPL-SCNC: 139 MMOL/L (ref 136–145)
WBC # BLD AUTO: 7.35 10*3/MM3 (ref 3.4–10.8)
WBC MORPH BLD: NORMAL
WHOLE BLOOD HOLD SPECIMEN: NORMAL

## 2021-11-02 PROCEDURE — 99152 MOD SED SAME PHYS/QHP 5/>YRS: CPT | Performed by: INTERNAL MEDICINE

## 2021-11-02 PROCEDURE — 85007 BL SMEAR W/DIFF WBC COUNT: CPT | Performed by: INTERNAL MEDICINE

## 2021-11-02 PROCEDURE — 25010000002 HEPARIN (PORCINE) PER 1000 UNITS: Performed by: INTERNAL MEDICINE

## 2021-11-02 PROCEDURE — C1760 CLOSURE DEV, VASC: HCPCS | Performed by: INTERNAL MEDICINE

## 2021-11-02 PROCEDURE — C1769 GUIDE WIRE: HCPCS | Performed by: INTERNAL MEDICINE

## 2021-11-02 PROCEDURE — 85610 PROTHROMBIN TIME: CPT | Performed by: INTERNAL MEDICINE

## 2021-11-02 PROCEDURE — 25010000002 FENTANYL CITRATE (PF) 50 MCG/ML SOLUTION: Performed by: INTERNAL MEDICINE

## 2021-11-02 PROCEDURE — 0 IOPAMIDOL PER 1 ML: Performed by: INTERNAL MEDICINE

## 2021-11-02 PROCEDURE — 25010000002 MIDAZOLAM PER 1 MG: Performed by: INTERNAL MEDICINE

## 2021-11-02 PROCEDURE — C1894 INTRO/SHEATH, NON-LASER: HCPCS | Performed by: INTERNAL MEDICINE

## 2021-11-02 PROCEDURE — 93458 L HRT ARTERY/VENTRICLE ANGIO: CPT | Performed by: INTERNAL MEDICINE

## 2021-11-02 PROCEDURE — 85025 COMPLETE CBC W/AUTO DIFF WBC: CPT | Performed by: INTERNAL MEDICINE

## 2021-11-02 PROCEDURE — 80048 BASIC METABOLIC PNL TOTAL CA: CPT | Performed by: INTERNAL MEDICINE

## 2021-11-02 RX ORDER — LIDOCAINE HYDROCHLORIDE 20 MG/ML
INJECTION, SOLUTION INFILTRATION; PERINEURAL AS NEEDED
Status: DISCONTINUED | OUTPATIENT
Start: 2021-11-02 | End: 2021-11-02 | Stop reason: HOSPADM

## 2021-11-02 RX ORDER — SODIUM CHLORIDE 9 MG/ML
75 INJECTION, SOLUTION INTRAVENOUS CONTINUOUS
Status: DISCONTINUED | OUTPATIENT
Start: 2021-11-02 | End: 2021-11-02

## 2021-11-02 RX ORDER — SODIUM CHLORIDE 0.9 % (FLUSH) 0.9 %
10 SYRINGE (ML) INJECTION AS NEEDED
Status: DISCONTINUED | OUTPATIENT
Start: 2021-11-02 | End: 2021-11-02 | Stop reason: HOSPADM

## 2021-11-02 RX ORDER — SODIUM CHLORIDE 0.9 % (FLUSH) 0.9 %
3 SYRINGE (ML) INJECTION EVERY 12 HOURS SCHEDULED
Status: DISCONTINUED | OUTPATIENT
Start: 2021-11-02 | End: 2021-11-02 | Stop reason: HOSPADM

## 2021-11-02 RX ORDER — FENTANYL CITRATE 50 UG/ML
INJECTION, SOLUTION INTRAMUSCULAR; INTRAVENOUS AS NEEDED
Status: DISCONTINUED | OUTPATIENT
Start: 2021-11-02 | End: 2021-11-02 | Stop reason: HOSPADM

## 2021-11-02 RX ORDER — SODIUM CHLORIDE 9 MG/ML
100 INJECTION, SOLUTION INTRAVENOUS CONTINUOUS
Status: DISCONTINUED | OUTPATIENT
Start: 2021-11-02 | End: 2021-11-02 | Stop reason: HOSPADM

## 2021-11-02 RX ORDER — ACETAMINOPHEN 325 MG/1
650 TABLET ORAL EVERY 4 HOURS PRN
Status: DISCONTINUED | OUTPATIENT
Start: 2021-11-02 | End: 2021-11-02 | Stop reason: HOSPADM

## 2021-11-02 RX ORDER — MIDAZOLAM HYDROCHLORIDE 1 MG/ML
INJECTION INTRAMUSCULAR; INTRAVENOUS AS NEEDED
Status: DISCONTINUED | OUTPATIENT
Start: 2021-11-02 | End: 2021-11-02 | Stop reason: HOSPADM

## 2021-11-02 RX ADMIN — SODIUM CHLORIDE 75 ML/HR: 9 INJECTION, SOLUTION INTRAVENOUS at 07:37

## 2021-11-02 NOTE — H&P
Cardiology History and Physical Note        Patient Name: Amol Hudson  Age/Sex: 81 y.o. male  : 1940  MRN: [id number]    Date of Admission :  2021    Primary care Physician: Jay Jay Beth, APRN    Reason for Admission: Chest pain positive myocardial perfusion scan left heart catheterization    Subjective:       Chief Complaint: Chest pain.    History of Present Illness:  Amol Hudson is a 81 y.o. male      height of 5 feet 10 inches weight 270 pounds with a body mass index of 24 with a past medical history significant for atherosclerotic coronary artery disease with previous PTCA and stenting of the left anterior descending artery for an aborted anterior wall myocardial infarction in 2000 at [facility] in [place], repeat coronary angiogram done in  which had revealed sustained patency in the left anterior descending artery site of previous angioplasty and stenting with nondominant circumflex artery which was free of any obstructive stenosis and a dominant right coronary artery with luminal irregularity, arterial hypertension, hypertensive heart disease, hyperlipidemia, acid esophageal reflux disease, bilateral sensorineural hearing deficit, carpal tunnel syndrome, osteoarthritis, non-insulin-dependent diabetes, hypothyroidism, gastroesophageal reflux disease and family history for coronary artery disease.     Patient has been active for his age.  Patient has complained of atypical chest discomfort on and off.  Patient symptoms of chest pain prior to my evaluation was 2 weeks ago.  Patient was [place] the golf [place] and has been able to play golf without any difficulty.  Patient resting heart rate is 57 bpm and is currently not on any AV blocking medication.  Patient does have history of diabetes.  Patient complains of having symptoms of dizziness but denies any complete loss of consciousness.     Patient is unable to describe the patient's symptoms of chest pain is similar in  quality as the one which she had prior to the stent placement.     Patient on further questioning denies any nausea vomiting.  Patient denies any paroxysmal nocturnal dyspnea orthopnea.  Patient denies any fever chill productive cough.  Patient denies any episodes of any bleeding diastasis.     Patient blood pressure is 125/75 pulse of 69 respiration of 18 oxygen saturation of 95%.     Patient resting electrocardiogram done reveals sinus bradycardia at the rate of 57 bpm with nonspecific ST-T wave changes.    Patient underwent a myocardial perfusion scan.  Patient myocardial perfusion scan was suggestive of ischemia in the inferior wall distribution.  Due to the patient positive myocardial perfusion scan with previous history of ostial right coronary artery stenosis patient was recommended coronary angiogram.     Patient 10 point review of system except for what stated in the history of present is negative.     Concurrent Medical History:  1.  Atypical chest discomfort with symptoms of dizziness with positive myocardial perfusion scan.  2.  Resting sinus bradycardia not on any AV blocking medication.  3.  Atherosclerotic coronary artery disease.  4.  Aborted myocardial infarction in 2000 with rescue PTCA and stenting of the left anterior descending artery done at [facility] in [place].  5.  Coronary angiogram done in 2005 had revealed:  A.  Sustained patency in the left anterior descending artery site of previous angioplasty and stenting.  B.  Ostial right coronary artery with 40 to 50% stenosis treated medically.  C.  Nondominant circumflex artery with luminal irregularity.  6.  Abnormal myocardial perfusion scan done in 2013 with reversible inferolateral ischemia treated medically.  7.  Arterial hypertension.  8.  Hypertensive heart disease.  9.  Nonrheumatic mild mitral and nonrheumatic mild tricuspid regurgitation.  10.  Hyperlipidemia.  11.  Non-insulin-dependent diabetes.  12.  Hypothyroidism.  13.   Benign prostatic hypertrophy.  14.  Gastroesophageal reflux disease.  15.  Bilateral sensorineural hearing deficit.  16.  Family history for coronary artery disease.  17.  Osteoarthritis.  18.  Carpal tunnel syndrome.  19.  Hiatal hernia        Past Surgical History:  1.  Coronary angiogram done in 2000 and 2005 with PTCA and stenting of the left anterior descending artery.  2.  Carpal tunnel release.  3.  Left rotator cuff repair.  4.  Colonoscopy.  5.  Esophagogastroduodenoscopy        Family History: Family history significant for atherosclerotic coronary artery disease in mother who had pacemaker        Social History: Previous history of tobacco abuse denies any current tobacco alcohol intake        Cardiac Risk factor:   1.  Male.  2.  Arterial hypertension.  3.  Hyperlipidemia.  4.  Diabetes.  5.  Family history for coronary artery disease.  6.  Previous history of tobacco abuse          Allergies:  No Known Allergies    Home Medication::  Prior to Admission medications    Medication Sig Start Date End Date Taking? Authorizing Provider   aspirin 81 MG chewable tablet Chew 81 mg Daily.    Modesta Pat MD   atorvastatin (LIPITOR) 80 MG tablet Take 80 mg by mouth Daily.    Modesta Pat MD   gabapentin (NEURONTIN) 300 MG capsule Take 300 mg by mouth Daily.    Modesta Pat MD   levothyroxine (SYNTHROID, LEVOTHROID) 50 MCG tablet Take 50 mcg by mouth Daily.    Modesta Pat MD   lisinopril (PRINIVIL,ZESTRIL) 5 MG tablet Take 5 mg by mouth Daily.    Modesta Pat MD   metFORMIN (GLUCOPHAGE) 500 MG tablet Take 500 mg by mouth Daily With Breakfast.    Modesta Pat MD   multivitamin with minerals tablet tablet Take 1 tablet by mouth Daily.    Modesta Pat MD   omeprazole (priLOSEC) 20 MG capsule Take 20 mg by mouth Daily.    Modesta Pat MD   tamsulosin (FLOMAX) 0.4 MG capsule 24 hr capsule Take 1 capsule by mouth Daily.    Modesta Pat MD          Review of Systems   Constitutional: Positive for fatigue. Negative for chills, fever and unexpected weight change.   HENT: Negative for hearing loss and nosebleeds.    Eyes: Negative for visual disturbance.   Respiratory: Positive for chest tightness. Negative for cough, shortness of breath and wheezing.    Cardiovascular: Positive for chest pain. Negative for palpitations and leg swelling.   Gastrointestinal: Negative for abdominal pain, blood in stool, constipation, diarrhea, nausea and vomiting.   Endocrine: Negative for cold intolerance, heat intolerance, polydipsia, polyphagia and polyuria.   Genitourinary: Negative for hematuria.   Musculoskeletal: Positive for back pain. Negative for joint swelling, myalgias and neck pain.   Skin: Negative for color change, rash and wound.   Neurological: Positive for dizziness and light-headedness. Negative for seizures, syncope, numbness and headaches.   Hematological: Does not bruise/bleed easily.         Objective:     There were no vitals taken for this visit.     Blood pressure 125/75 pulse of 69 respiration of 18 oxygen saturation of 95%    Constitutional:       Appearance: Well-developed.   Eyes:      General: No scleral icterus.     Conjunctiva/sclera: Conjunctivae normal.      Pupils: Pupils are equal, round, and reactive to light.   HENT:      Head: Normocephalic and atraumatic.      Left Ear: External ear normal.      Nose: Nose normal.   Neck:      Thyroid: No thyromegaly.      Vascular: No JVD.      Trachea: No tracheal deviation.      Lymphadenopathy: No cervical adenopathy.   Pulmonary:      Breath sounds: Normal breath sounds. No stridor.   Cardiovascular:      Normal rate. Regular rhythm.   Abdominal:      General: Bowel sounds are normal.   Musculoskeletal: Normal range of motion.      Cervical back: Normal range of motion and neck supple. Skin:     General: Skin is warm and dry.   Neurological:      Mental Status: Alert and oriented to person,  place, and time.      Deep Tendon Reflexes: Reflexes are normal and symmetric.   Psychiatric:         Behavior: Behavior normal.         Thought Content: Thought content normal.         Judgment: Judgment normal.         Lab Review:           Invalid input(s): LABALBU, JANIS                                    EKG:   ECG/EMG Results (last 24 hours)     ** No results found for the last 24 hours. **          Imaging:  Imaging Results (Last 24 Hours)     ** No results found for the last 24 hours. **          I personally viewed and interpreted the patient's EKG/Telemetry data.    Assessment:       1.  Chest pain.  2.  Sinus bradycardia not on any AV blocking medication.  3.  Atherosclerotic coronary artery disease.  4.  Previous aborted anterior wall myocardial infarction.  5.  Arterial hypertension.  6.  Hypertensive heart disease.  7.  Diabetes.  8.  Hypothyroidism.  9.  Hyperlipidemia.      Plan:   1.  Chest pain.  Patient has history of documented atherosclerotic coronary artery disease with previous aborted anterior wall myocardial infarction in 2000 with repeat coronary angiogram done in 2005 which had revealed ostial right coronary artery with 40 to 50% stenosis and a positive myocardial perfusion scan done in 2013 which was treated medically.  Patient has resting sinus bradycardia currently not on any AV blocking medication.  Patient has symptoms of chest pain which is on and off.  Patient resting electrocardiogram did not show any acute ST-T wave changes except for the sinus bradycardia.  Patient underwent a myocardial perfusion scan.  Patient myocardial perfusion scan was suggestive of reversible ischemia.  Patient was recommended coronary angiogram.  Risk-benefit treatment option for the coronary angiogram were discussed with the patient and an informed consent was obtained.  Patient Mallampati score #2 patient ASA classification 1 #2.  Risk for minimal sedation was also discussed with the patient.       2.   Sinus bradycardia.  Patient is currently not on any AV blocking medication.  Patient has complained of having symptoms of lightheaded dizziness.  Patient does have family history for mother who had a pacemaker.  Patient does have history of diabetes.  Patient has had previous coronary angiogram in 2005 which had revealed ostial right coronary artery 40 to 50% stenosis.  Have discussed with the patient the possibility of underlying sick sinus syndrome though patient at the present time has not complained of having any syncopal episode.  Patient would undergo the stress test evaluation to rule out chronotropic incompetence.     3.  Arterial hypertension.  Patient blood pressure 125/75.  Patient would be continued on the present dose of the lisinopril 5 mg daily.  Patient has been counseled to decrease her salt intake.     4.  Hypertensive heart disease with nonrheumatic mitral and tricuspid regurgitation.  Clinically at the present time patient is euvolemic and not in congestive heart failure.     5.  Hyperlipidemia.  Patient has been counseled on low-fat low-cholesterol diet and to continue with the present dose of the Lipitor 80 mg at bedtime.  Patient is to undergo a lipid profile including liver function test evaluation.     6.  Non-insulin-dependent diabetes.  Patient is on Metformin 5 mg daily.  Patient has been counseled on American diabetic Association diet.     7.  Hypothyroidism.  Patient is on thyroid supplement and has been followed by the primary care physician.     8.  Gastroesophageal reflux disease.  Patient is on Prilosec 20 mg daily.     9.  Benign prostatic hypertrophy patient is on Flomax.  Patient has had previous evaluation by urology.     10.  Arthritis with back pain.  Patient is on gabapentin 300 mg daily.     11.  Bilateral sensorineural hearing deficit is noted.     The above plan of management were discussed with the patient         Time: time spent in face-to-face evaluation of greater  than 55 minutes and interacting and formulating examining and discussing the plan with the patient with 50% of greater time spent in face-to-face interaction.    Electronically signed by Jerod Del Cid MD, 11/01/21, 11:10 PM CDT.    Dictated utilizing Dragon dictation.

## 2021-11-04 ENCOUNTER — DOCUMENTATION (OUTPATIENT)
Dept: CARDIAC REHAB | Facility: HOSPITAL | Age: 81
End: 2021-11-04

## 2021-11-29 ENCOUNTER — TRANSCRIBE ORDERS (OUTPATIENT)
Dept: PHYSICAL THERAPY | Facility: HOSPITAL | Age: 81
End: 2021-11-29

## 2021-11-29 DIAGNOSIS — M16.0 OSTEOARTHRITIS OF BOTH HIPS, UNSPECIFIED OSTEOARTHRITIS TYPE: Primary | ICD-10-CM

## 2021-11-29 DIAGNOSIS — M51.36 DDD (DEGENERATIVE DISC DISEASE), LUMBAR: ICD-10-CM

## 2021-12-02 ENCOUNTER — HOSPITAL ENCOUNTER (OUTPATIENT)
Dept: PHYSICAL THERAPY | Facility: HOSPITAL | Age: 81
Setting detail: THERAPIES SERIES
Discharge: HOME | End: 2021-12-02

## 2021-12-02 DIAGNOSIS — M16.0 OSTEOARTHRITIS OF BOTH HIPS, UNSPECIFIED OSTEOARTHRITIS TYPE: ICD-10-CM

## 2021-12-02 DIAGNOSIS — M51.36 DDD (DEGENERATIVE DISC DISEASE), LUMBAR: Primary | ICD-10-CM

## 2021-12-02 PROCEDURE — 97162 PT EVAL MOD COMPLEX 30 MIN: CPT | Performed by: PHYSICAL THERAPIST

## 2021-12-02 NOTE — THERAPY EVALUATION
Outpatient Physical Therapy Ortho Initial Evaluation  [facility]     Patient Name: Amol Hudson  : 1940  MRN: [id number]  Today's Date: 2021      Visit Date: 2021  Visit   Return to MD:NADIA  Re-cert date: 21  Patient Active Problem List   Diagnosis   • History of PTCA        Past Medical History:   Diagnosis Date   • Diabetes (HCC)    • Diabetic neuritis (HCC)    • Enlarged prostate    • GERD (gastroesophageal reflux disease)    • HTN (hypertension)    • Hyperlipemia    • Hypothyroid         Past Surgical History:   Procedure Laterality Date   • ANGIOPLASTY     • CARDIAC CATHETERIZATION Left 2021    Procedure: Left Heart Cath;  Surgeon: Jerod Del Cid MD;  Location: [facility] CATH INVASIVE LOCATION;  Service: Cardiology;  Laterality: Left;   • CARPAL TUNNEL RELEASE Bilateral    • ROTATOR CUFF REPAIR Left        Visit Dx:     ICD-10-CM ICD-9-CM   1. DDD (degenerative disc disease), lumbar  M51.36 722.52   2. Osteoarthritis of both hips, unspecified osteoarthritis type  M16.0 715.95     Medications (Admitted on 2021)         aspirin 81 MG chewable tablet    atorvastatin (LIPITOR) 80 MG tablet    gabapentin (NEURONTIN) 300 MG capsule    levothyroxine (SYNTHROID, LEVOTHROID) 50 MCG tablet    lisinopril (PRINIVIL,ZESTRIL) 5 MG tablet    multivitamin with minerals tablet tablet    omeprazole (priLOSEC) 20 MG capsule    tamsulosin (FLOMAX) 0.4 MG capsule 24 hr capsule       Allergies: NKA     PT Ortho     Row Name 21 5288       Subjective Comments    Subjective Comments 82 yo male with chronic LBP for many years, most recent episode starting around 6 months ago. Diagnosed with lumbar DDD and B hip OA. Very active retired 82 yo male. Plays golf, hunts and climbs into deer stands, bow xochilt, actively doing yardwork. H/o SI joint problems. Insidious onset 6 months ago. Pt thinks his recent pain is due to busy lifestyle. Aggravating factors: walking, lifting heavy weight,  rolling left in bed. Easing factors: meds-Aleve, tylenol.  -BS       Precautions and Contraindications    Precautions/Limitations no known precautions/limitations; fall precautions  -BS       Subjective Pain    Able to rate subjective pain? yes  -BS    Pre-Treatment Pain Level 0  -BS    Post-Treatment Pain Level 0  -BS    Subjective Pain Comment 8-9/10 intermittent LBP; 0/10 B hip pain  -BS       Posture/Observations    Posture/Observations Comments level SI joint  -BS       Quarter Clearing    Quarter Clearing Lower Quarter Clearing  -BS       Neural Tension Signs- Lower Quarter Clearing    SLR Bilateral:; Negative  -BS       Sensory Screen for Light Touch- Lower Quarter Clearing    L1 (inguinal area) Intact; Bilateral:  -BS    L2 (anterior mid thigh) Intact; Bilateral:  -BS    L3 (distal anterior thigh) Intact; Bilateral:  -BS    L4 (medial lower leg/foot) Intact; Bilateral:  -BS    L5 (lateral lower leg/great toe) Intact; Bilateral:  -BS    S1 (bottom of foot) Intact; Bilateral:  -BS       Myotomal Screen- Lower Quarter Clearing    Hip flexion (L2) Bilateral:; 5 (Normal)  -BS    Knee extension (L3) Bilateral:; 5 (Normal)  -BS    Ankle DF (L4) Bilateral:; 5 (Normal)  -BS    Great toe extension (L5) Bilateral:; WNL  -BS    Ankle PF (S1) Bilateral:; 5 (Normal)  -BS    Knee flexion (S2) Bilateral:; 5 (Normal)  -BS       Lumbar ROM Screen- Lower Quarter Clearing    Lumbar Flexion Normal  -BS    Lumbar Extension Normal  -BS    Lumbar Lateral Flexion Normal  -BS    Lumbar Rotation Normal  -BS       SI/Hip Screen- Lower Quarter Clearing    Ynes's/Suleman's test Left:; Positive; Right:; Negative  L hip stiff with YNES test on L  -BS       Special Tests/Palpation    Special Tests/Palpation Lumbar/SI  -BS       Lumbar/SI Special Tests    SI Compression Test (SI Dysfunction) Negative  -BS    SI Distraction Test (SI Dysfunction) Negative  -BS    YNES (hip vs. SI Dysfunction) Left:; Positive; Right:; Negative  -BS        General ROM    GENERAL ROM COMMENTS AROM-R hip flex 110 R knee 0-125 L hip flex 100 L knee 0-130 B ankle DF/PF grossly WFL  -BS       MMT (Manual Muscle Testing)    General MMT Comments B hip abd 5/5 B hip ext 5/5  -BS       Flexibility    Flexibility Tested? Lower Extremity  -BS       Lower Extremity Flexibility    Hamstrings Bilateral:; Mildly limited  -BS    Hip Flexors Bilateral:; WNL  -BS    Quadriceps Right:; Moderately limited; Left:; Mildly limited  -BS    ITB Bilateral:; WNL  -BS    Hip External Rotators Bilateral:; Mildly limited  -BS          User Key  (r) = Recorded By, (t) = Taken By, (c) = Cosigned By    Initials Name Provider Type    Harvey Nye, PT Physical Therapist                                   PT OP Goals     Row Name 12/02/21 1400          PT Short Term Goals    STG Date to Achieve 12/23/21  -BS     STG 1 Improve core trunk stability to WFL  -BS     STG 1 Progress New  -BS     STG 2 Improve B hamstrings and B quads flexibility wto WFL  -BS     STG 2 Progress New  -BS     STG 3 Reduce Modified Oswestry score to 26% or less  -BS     STG 3 Progress New  -BS     STG 4 Reduce LBP by 50% with walking and carrying heavy weight  -BS     STG 4 Progress New  -BS            Time Calculation    PT Goal Re-Cert Due Date 12/23/21  -BS           User Key  (r) = Recorded By, (t) = Taken By, (c) = Cosigned By    Initials Name Provider Type    Harvey Ney, PT Physical Therapist                 PT Assessment/Plan     Row Name 12/02/21 1450          PT Assessment    Functional Limitations Impaired gait; Performance in work activities; Performance in leisure activities  -BS     Impairments Pain; Muscle strength; Range of motion; Gait  -BS           User Key  (r) = Recorded By, (t) = Taken By, (c) = Cosigned By    Initials Name Provider Type    Harvey Nye, PT Physical Therapist                   OP Exercises     Row Name 12/02/21 1450             Subjective Comments    Subjective Comments 80 yo  "male with chronic LBP for many years, most recent episode starting around 6 months ago. Diagnosed with lumbar DDD and B hip OA. Very active retired 82 yo male. Plays golf, hunts and climbs into deer stands, bow xochilt, actively doing yardwork. H/o SI joint problems. Insidious onset 6 months ago. Pt thinks his recent pain is due to busy lifestyle. Aggravating factors: walking, lifting heavy weight, rolling left in bed. Easing factors: meds-Aleve, tylenol.  -BS              Subjective Pain    Able to rate subjective pain? yes  -BS      Pre-Treatment Pain Level 0  -BS      Post-Treatment Pain Level 0  -BS      Subjective Pain Comment 8-9/10 intermittent LBP; 0/10 B hip pain  -BS              Exercise 1    Exercise Name 1 RFISitting  -BS      Sets 1 1  -BS      Reps 1 10  -BS              Exercise 2    Exercise Name 2 standing B HS S  -BS      Sets 2 1  -BS      Reps 2 1  -BS      Time 2 30\" hold  -BS              Exercise 3    Exercise Name 3 supine Ketan S, B  -BS      Sets 3 1  -BS      Reps 3 2  -BS      Time 3 30\" hold  -BS              Exercise 4    Exercise Name 4 supine B piriformis S  -BS      Sets 4 1  -BS      Reps 4 2  -BS      Time 4 30\" hold  -BS              Exercise 5    Exercise Name 5 sustained MEHRAN  -BS      Sets 5 1  -BS      Reps 5 1  -BS      Time 5 30\" hold  -BS              Exercise 6    Exercise Name 6 LTR  -BS      Sets 6 1  -BS      Reps 6 10  -BS      Time 6 5\" hold  -BS            User Key  (r) = Recorded By, (t) = Taken By, (c) = Cosigned By    Initials Name Provider Type    Harvey Nye PT Physical Therapist                  12/02/21 2354   PT Assessment   Functional Limitations Impaired gait; Performance in work activities; Performance in leisure activities   Impairments Pain; Muscle strength; Range of motion; Gait   Assessment Comments 82 yo male with chronic LBP due to DDD and B hip pain due to osteoarthritis.   Please refer to paper survey for additional self-reported information " Yes   Rehab Potential Good   Patient/caregiver participated in establishment of treatment plan and goals Yes   Patient would benefit from skilled therapy intervention Yes   PT Plan   PT Frequency 1x/week; 2x/week   Predicted Duration of Therapy Intervention (PT) 3-4 weeks then TBD   Planned CPT's? PT EVAL MOD COMPLELITY: 52384; PT RE-EVAL: 32267; PT THER PROC EA 15 MIN: 67551; PT THER ACT EA 15 MIN: 96299; PT MANUAL THERAPY EA 15 MIN: 22833; PT NEUROMUSC RE-EDUCATION EA 15 MIN: 39944; PT GAIT TRAINING EA 15 MIN: 79977; PT HOT OR COLD PACK TREAT MCARE; PT ELECTRICAL STIM UNATTEND: ; PT TRACTION LUMBAR: 75561; PT THER SUPP EA 15 MIN   Physical Therapy Interventions (Optional Details) balance training; home exercise program; lumbar stabilization; joint mobilization; manual therapy techniques; modalities; neuromuscular re-education; patient/family education; postural re-education; ROM (Range of Motion); strengthening; stretching; swiss ball techniques   PT Plan Comments Address hamstrings and quad tightness bilaterally. Address core stabillity.                  Outcome Measure Options: Modifed Owestry  Modified Oswestry  Modified Oswestry Score/Comments: 18/50-36%      Time Calculation:     Start Time: 1450  Stop Time: 1537  Time Calculation (min): 47 min  Total Timed Code Minutes- PT: 47 minute(s)     Therapy Charges for Today     Code Description Service Date Service Provider Modifiers Qty    [id number]  PT EVAL MOD COMPLEXITY 4 12/2/2021 Harvey Tan, PT GP 1          PT G-Codes  Outcome Measure Options: Modifed Owestry  Modified Oswestry Score/Comments: 18/50-36%         Harvey Tan PT  12/2/2021

## 2021-12-06 ENCOUNTER — APPOINTMENT (OUTPATIENT)
Dept: CARDIOLOGY | Facility: HOSPITAL | Age: 81
End: 2021-12-06

## 2021-12-07 ENCOUNTER — HOSPITAL ENCOUNTER (OUTPATIENT)
Dept: PHYSICAL THERAPY | Facility: HOSPITAL | Age: 81
Setting detail: THERAPIES SERIES
Discharge: HOME | End: 2021-12-07

## 2021-12-07 DIAGNOSIS — M51.36 DDD (DEGENERATIVE DISC DISEASE), LUMBAR: Primary | ICD-10-CM

## 2021-12-07 PROCEDURE — 97110 THERAPEUTIC EXERCISES: CPT

## 2021-12-07 NOTE — THERAPY TREATMENT NOTE
Outpatient Physical Therapy Ortho Treatment Note   [place]     Patient Name: Amol Hudson  : 1940  MRN: [id number]  Today's Date: 2021      Visit Date: 2021    Attendance:    Approved  Subjective Improvement:     MD Appt:   TBD  Recheck Due:   21    Visit Dx:    ICD-10-CM ICD-9-CM   1. DDD (degenerative disc disease), lumbar  M51.36 722.52       Patient Active Problem List   Diagnosis   • History of PTCA        Past Medical History:   Diagnosis Date   • Diabetes (HCC)    • Diabetic neuritis (HCC)    • Enlarged prostate    • GERD (gastroesophageal reflux disease)    • HTN (hypertension)    • Hyperlipemia    • Hypothyroid         Past Surgical History:   Procedure Laterality Date   • ANGIOPLASTY     • CARDIAC CATHETERIZATION Left 2021    Procedure: Left Heart Cath;  Surgeon: Jerod Del Cid MD;  Location: [facility] INVASIVE LOCATION;  Service: Cardiology;  Laterality: Left;   • CARPAL TUNNEL RELEASE Bilateral    • ROTATOR CUFF REPAIR Left         PT Ortho     Row Name 21 1300       Subjective Comments    Subjective Comments No pain currently.  Has most pain in morning when he first gets up and starts moving about.  -TM       Precautions and Contraindications    Precautions/Limitations no known precautions/limitations  -TM       Subjective Pain    Able to rate subjective pain? yes  -TM    Pre-Treatment Pain Level 0  -TM    Post-Treatment Pain Level 0  -TM       Posture/Observations    Posture/Observations Comments R LE shoret in supine; ASIS level; IC level  -TM          User Key  (r) = Recorded By, (t) = Taken By, (c) = Cosigned By    Initials Name Provider Type    TM Sarah Bowen PTA Physical Therapy Assistant                             PT Assessment/Plan     Row Name 21 1300          PT Assessment    Assessment Comments Pt tolerated therex well.  Good understanding of importance of core strength in relation to DDD.  Reported minimal  "pain in R groin with TKE, but resolved quickly.  -TM            PT Plan    PT Plan Comments Pt requests to schedule only 1x/week at this time.  Bent knee leg lift & bridges next visit.  -TM           User Key  (r) = Recorded By, (t) = Taken By, (c) = Cosigned By    Initials Name Provider Type    TM Sarah Bowen, SUZIE Physical Therapy Assistant                   OP Exercises     Row Name 12/07/21 1300             Subjective Comments    Subjective Comments No pain currently.  Has most pain in morning when he first gets up and starts moving about.  -TM              Subjective Pain    Able to rate subjective pain? yes  -TM      Pre-Treatment Pain Level 0  -TM      Post-Treatment Pain Level 0  -TM              Exercise 1    Exercise Name 1 PRO II ROM  -TM      Time 1 8 min  -TM      Additional Comments L 4  -TM              Exercise 2    Exercise Name 2 standing B HS S  -TM      Reps 2 2  -TM      Time 2 30\"  -TM              Exercise 3    Exercise Name 3 supine Ketan S, B  -TM      Reps 3 2  -TM      Time 3 30\"  -TM              Exercise 4    Exercise Name 4 supine B piriformis S  -TM      Reps 4 2  -TM      Time 4 30\"  -TM              Exercise 5    Exercise Name 5 assess alignment  -TM              Exercise 6    Exercise Name 6 isometric trans abs  -TM      Sets 6 1  -TM      Reps 6 15  -TM      Time 6 5\"  -TM              Exercise 7    Exercise Name 7 isometric hip Add  -TM      Sets 7 1  -TM      Reps 7 15  -TM      Time 7 5\"  -TM              Exercise 8    Exercise Name 8 T Band hip Abd  -TM      Sets 8 1  -TM      Reps 8 15  -TM      Additional Comments red  -TM              Exercise 9    Exercise Name 9 SL B  clamshell w/trans abs  -TM      Sets 9 1  -TM      Reps 9 15  -TM      Time 9 5\"  -TM              Exercise 10    Exercise Name 10 prone TKE/GS  -TM      Sets 10 1  -TM      Reps 10 15  -TM      Time 10 5\"  -TM              Exercise 11    Exercise Name 11 log roll transfer education  -TM            " User Key  (r) = Recorded By, (t) = Taken By, (c) = Cosigned By    Initials Name Provider Type    TM Sarah Bowen PTA Physical Therapy Assistant                              PT OP Goals     Row Name 12/07/21 1300          PT Short Term Goals    STG Date to Achieve 12/23/21  -TM     STG 1 Improve core trunk stability to WFL  -TM     STG 1 Progress Ongoing  -TM     STG 2 Improve B hamstrings and B quads flexibility wto WFL  -TM     STG 2 Progress Ongoing  -TM     STG 3 Reduce Modified Oswestry score to 26% or less  -TM     STG 3 Progress Ongoing  -TM     STG 4 Reduce LBP by 50% with walking and carrying heavy weight  -TM     STG 4 Progress Ongoing  -TM           User Key  (r) = Recorded By, (t) = Taken By, (c) = Cosigned By    Initials Name Provider Type    TM Sarah Bowen PTA Physical Therapy Assistant                               Time Calculation:   Start Time: 1305  Stop Time: 1345  Time Calculation (min): 40 min  Total Timed Code Minutes- PT: 40 minute(s)  Therapy Charges for Today     Code Description Service Date Service Provider Modifiers Qty    [id number] HC PT THER PROC EA 15 MIN 12/7/2021 Sarah Bowen PTA GP 3                    Sarah Bowen PTA  12/7/2021

## 2021-12-09 ENCOUNTER — APPOINTMENT (OUTPATIENT)
Dept: PHYSICAL THERAPY | Facility: HOSPITAL | Age: 81
End: 2021-12-09

## 2021-12-16 ENCOUNTER — APPOINTMENT (OUTPATIENT)
Dept: PHYSICAL THERAPY | Facility: HOSPITAL | Age: 81
End: 2021-12-16

## 2022-07-18 ENCOUNTER — HOSPITAL ENCOUNTER (OUTPATIENT)
Dept: NUCLEAR MEDICINE | Facility: HOSPITAL | Age: 82
Discharge: HOME | End: 2022-07-18

## 2022-07-18 VITALS
HEART RATE: 60 BPM | RESPIRATION RATE: 18 BRPM | DIASTOLIC BLOOD PRESSURE: 73 MMHG | SYSTOLIC BLOOD PRESSURE: 157 MMHG | OXYGEN SATURATION: 97 %

## 2022-07-18 DIAGNOSIS — R10.11 ABDOMINAL PAIN, RIGHT UPPER QUADRANT: ICD-10-CM

## 2022-07-18 PROCEDURE — A9537 TC99M MEBROFENIN: HCPCS | Performed by: NURSE PRACTITIONER

## 2022-07-18 PROCEDURE — 78227 HEPATOBIL SYST IMAGE W/DRUG: CPT

## 2022-07-18 PROCEDURE — 0 TECHNETIUM TC 99M MEBROFENIN KIT: Performed by: NURSE PRACTITIONER

## 2022-07-18 PROCEDURE — 25010000002 MORPHINE PER 10 MG

## 2022-07-18 RX ORDER — KIT FOR THE PREPARATION OF TECHNETIUM TC 99M MEBROFENIN 45 MG/10ML
1 INJECTION, POWDER, LYOPHILIZED, FOR SOLUTION INTRAVENOUS
Status: COMPLETED | OUTPATIENT
Start: 2022-07-18 | End: 2022-07-18

## 2022-07-18 RX ADMIN — MORPHINE SULFATE: 4 INJECTION INTRAVENOUS at 14:35

## 2022-07-18 RX ADMIN — Medication: at 14:35

## 2022-07-18 RX ADMIN — MEBROFENIN 1 DOSE: 45 INJECTION, POWDER, LYOPHILIZED, FOR SOLUTION INTRAVENOUS at 12:39

## 2022-10-04 ENCOUNTER — TRANSCRIBE ORDERS (OUTPATIENT)
Dept: ORTHOPEDIC SURGERY | Facility: CLINIC | Age: 82
End: 2022-10-04

## 2022-10-04 DIAGNOSIS — M67.431 GANGLION OF RIGHT WRIST: Primary | ICD-10-CM

## 2022-10-04 DIAGNOSIS — M75.81 TENDINITIS OF RIGHT ROTATOR CUFF: ICD-10-CM

## 2022-10-26 DIAGNOSIS — M25.531 WRIST PAIN, RIGHT: Primary | ICD-10-CM

## 2022-10-27 ENCOUNTER — OFFICE VISIT (OUTPATIENT)
Dept: ORTHOPEDIC SURGERY | Facility: CLINIC | Age: 82
End: 2022-10-27

## 2022-10-27 VITALS — BODY MASS INDEX: 25.62 KG/M2 | WEIGHT: 179 LBS | HEIGHT: 70 IN

## 2022-10-27 DIAGNOSIS — E11.9 TYPE 2 DIABETES MELLITUS WITHOUT COMPLICATION, WITHOUT LONG-TERM CURRENT USE OF INSULIN: ICD-10-CM

## 2022-10-27 DIAGNOSIS — K21.9 GERD WITHOUT ESOPHAGITIS: ICD-10-CM

## 2022-10-27 DIAGNOSIS — I10 ESSENTIAL HYPERTENSION: ICD-10-CM

## 2022-10-27 DIAGNOSIS — M19.031 PRIMARY OSTEOARTHRITIS, RIGHT WRIST: Primary | ICD-10-CM

## 2022-10-27 PROCEDURE — 99203 OFFICE O/P NEW LOW 30 MIN: CPT | Performed by: ORTHOPAEDIC SURGERY

## 2022-10-27 NOTE — PROGRESS NOTES
Amol Hudson is a 82 y.o. male   Primary provider:  Jay Jay Beth APRN       Chief Complaint   Patient presents with   • Right Hand - Pain       HISTORY OF PRESENT ILLNESS:    Patient reports no specific injury.  He has been having pain on the back of his hand.  Pain has been progressing for approximately 6 months.  He states he has a difficult time with golfing at times.  For the most part, he is able to maintain activities of daily living.  He has noticed a mass on the dorsal aspect of his wrist.  No erythema.  No fevers or chills.    Pain  This is a chronic problem. Episode onset: 6 MONTHS.  The problem occurs intermittently. The problem has been unchanged. Pertinent negatives include no chills or fever. Associated symptoms comments: STABBING. . Exacerbated by: GOLFING.  He has tried NSAIDs for the symptoms.        CONCURRENT MEDICAL HISTORY:    Past Medical History:   Diagnosis Date   • Diabetes (HCC)    • Diabetic neuritis (HCC)    • Enlarged prostate    • GERD (gastroesophageal reflux disease)    • HTN (hypertension)    • Hyperlipemia    • Hypothyroid        No Known Allergies      Current Outpatient Medications:   •  aspirin 81 MG chewable tablet, Chew 81 mg Daily., Disp: , Rfl:   •  atorvastatin (LIPITOR) 80 MG tablet, Take 80 mg by mouth Daily., Disp: , Rfl:   •  gabapentin (NEURONTIN) 300 MG capsule, Take 300 mg by mouth Daily., Disp: , Rfl:   •  levothyroxine (SYNTHROID, LEVOTHROID) 50 MCG tablet, Take 50 mcg by mouth Daily., Disp: , Rfl:   •  metFORMIN (GLUCOPHAGE) 500 MG tablet, Take 1 tablet by mouth 2 (Two) Times a Day With Meals., Disp: , Rfl:   •  multivitamin with minerals tablet tablet, Take 1 tablet by mouth Daily., Disp: , Rfl:   •  omeprazole (priLOSEC) 20 MG capsule, Take 20 mg by mouth Daily., Disp: , Rfl:   •  tamsulosin (FLOMAX) 0.4 MG capsule 24 hr capsule, Take 1 capsule by mouth Daily., Disp: , Rfl:   •  lisinopril (PRINIVIL,ZESTRIL) 5 MG tablet, Take 5 mg by mouth Daily., Disp:  ", Rfl:     Past Surgical History:   Procedure Laterality Date   • ANGIOPLASTY     • CARDIAC CATHETERIZATION Left 11/2/2021    Procedure: Left Heart Cath;  Surgeon: Jerod Del Cid MD;  Location: [facility] CATH INVASIVE LOCATION;  Service: Cardiology;  Laterality: Left;   • CARPAL TUNNEL RELEASE Bilateral    • ROTATOR CUFF REPAIR Left        Family History   Problem Relation Age of Onset   • Heart disease Other    • Hypertension Other    • Diabetes Other    • Skin cancer Other         Social History     Socioeconomic History   • Marital status:    Tobacco Use   • Smoking status: Former   • Smokeless tobacco: Never   Substance and Sexual Activity   • Sexual activity: Defer        Review of Systems   Constitutional: Negative for chills and fever.   Eyes: Positive for pain.   Respiratory: Negative.    Cardiovascular: Negative.    Musculoskeletal:        Joint pain   All other systems reviewed and are negative.      PHYSICAL EXAMINATION:       Ht 177.8 cm (70\")   Wt 81.2 kg (179 lb)   BMI 25.68 kg/m²     Physical Exam  Constitutional:       General: He is not in acute distress.     Appearance: Normal appearance.   Pulmonary:      Effort: Pulmonary effort is normal. No respiratory distress.   Neurological:      Mental Status: He is alert and oriented to person, place, and time.             Right Hand Exam     Comments:  Mild limitation in full flexion and extension of the wrist.  There is a spongy mass on the dorsal aspect of his wrist consistent with an extensor tendon sheath cyst or ganglion cyst.  No erythema.  Good distal pulses and sensation.  Good finger motion.              XR Wrist 3+ View Right    Result Date: 10/27/2022  Narrative: Ordering Provider:  Victor M Tran MD Ordering Diagnosis/Indication:  Wrist pain, right Procedure:  XR WRIST 3+ VW RIGHT Exam Date:  10/27/22 COMPARISON:  Not applicable, no relevant images available.     Impression:  3 views of the right wrist show diffuse " degenerative changes throughout the carpus.  There is significant degenerative change in the radioscapholunate segment.  There is mild widening of the scapholunate interval.  Significant sclerosis noted at the radial scaphoid junction.  There is loss of height between the first and second carpal row.  Mild intercalated collapse noted on the lateral view.  Diffuse osteopenia is noted.  No acute findings. Victor M Tran MD 10/27/22           ASSESSMENT:    Diagnoses and all orders for this visit:    Primary osteoarthritis, right wrist    Essential hypertension    GERD without esophagitis    Type 2 diabetes mellitus without complication, without long-term current use of insulin (HCC)    Other orders  -     metFORMIN (GLUCOPHAGE) 500 MG tablet; Take 1 tablet by mouth 2 (Two) Times a Day With Meals.          PLAN    Patient and remains active.  We discussed continued activity.  We discussed continued strength and conditioning exercises.  Slowly increase activity as pain allows.  No true restrictions.  He does have arthritis change in his wrist and we discussed the possibility of steroid injection if his symptoms worsen.  No specific treatment necessary for the cystic mass on the dorsum of his hand unless he begins having pain or has skin changes or it gets larger in size.  Follow-up as needed.    Return if symptoms worsen or fail to improve, for recheck.    Victor M Tran MD

## 2023-02-14 ENCOUNTER — OFFICE VISIT (OUTPATIENT)
Dept: PODIATRY | Facility: CLINIC | Age: 83
End: 2023-02-14
Payer: MEDICARE

## 2023-02-14 VITALS — BODY MASS INDEX: 25.62 KG/M2 | HEART RATE: 58 BPM | HEIGHT: 70 IN | OXYGEN SATURATION: 98 % | WEIGHT: 179 LBS

## 2023-02-14 DIAGNOSIS — B35.3 TINEA PEDIS OF BOTH FEET: Primary | ICD-10-CM

## 2023-02-14 PROCEDURE — 99204 OFFICE O/P NEW MOD 45 MIN: CPT | Performed by: NURSE PRACTITIONER

## 2023-02-14 RX ORDER — CLOTRIMAZOLE AND BETAMETHASONE DIPROPIONATE 10; .64 MG/G; MG/G
1 CREAM TOPICAL 2 TIMES DAILY
Qty: 45 G | Refills: 1 | Status: SHIPPED | OUTPATIENT
Start: 2023-02-14

## 2023-02-14 NOTE — PROGRESS NOTES
Amol Hudson  1940  82 y.o. male  PCP: Jay Jay Beth   BS: 116 per patient     02/14/2023    Chief Complaint   Patient presents with   • Left Foot - Rash   • Right Foot - Rash       History of Present Illness    Amol Hudson is a 82 y.o.male came to clinic for concern of bilateral foot rash.     82-year-old gentleman in the office today for an evaluation of a rash on both feet.  He has been evaluated by his primary care provider who started him on some antifungal creams and steroids none of which seem to completely resolve the symptoms but he reports that he has not been consistently using them.      Past Medical History:   Diagnosis Date   • Diabetes (HCC)    • Diabetic neuritis (HCC)    • Enlarged prostate    • GERD (gastroesophageal reflux disease)    • HTN (hypertension)    • Hyperlipemia    • Hypothyroid          Past Surgical History:   Procedure Laterality Date   • ANGIOPLASTY     • CARDIAC CATHETERIZATION Left 11/2/2021    Procedure: Left Heart Cath;  Surgeon: Jerod Del Cid MD;  Location: [facility] CATH INVASIVE LOCATION;  Service: Cardiology;  Laterality: Left;   • CARPAL TUNNEL RELEASE Bilateral    • ROTATOR CUFF REPAIR Left          Family History   Problem Relation Age of Onset   • Heart disease Other    • Hypertension Other    • Diabetes Other    • Skin cancer Other        No Known Allergies    Social History     Socioeconomic History   • Marital status:    Tobacco Use   • Smoking status: Former   • Smokeless tobacco: Never   Substance and Sexual Activity   • Sexual activity: Defer         Current Outpatient Medications   Medication Sig Dispense Refill   • aspirin 81 MG chewable tablet Chew 81 mg Daily.     • atorvastatin (LIPITOR) 80 MG tablet Take 80 mg by mouth Daily.     • gabapentin (NEURONTIN) 300 MG capsule Take 300 mg by mouth Daily.     • levothyroxine (SYNTHROID, LEVOTHROID) 50 MCG tablet Take 50 mcg by mouth Daily.     • lisinopril (PRINIVIL,ZESTRIL) 5 MG tablet Take 5 mg by  "mouth Daily.     • metFORMIN (GLUCOPHAGE) 500 MG tablet Take 1 tablet by mouth 2 (Two) Times a Day With Meals.     • multivitamin with minerals tablet tablet Take 1 tablet by mouth Daily.     • omeprazole (priLOSEC) 20 MG capsule Take 20 mg by mouth Daily.     • tamsulosin (FLOMAX) 0.4 MG capsule 24 hr capsule Take 1 capsule by mouth Daily.     • clotrimazole-betamethasone (LOTRISONE) 1-0.05 % cream Apply 1 application topically to the appropriate area as directed 2 (Two) Times a Day. Apply to affected area twice daily 45 g 1     No current facility-administered medications for this visit.       Review of Systems   Musculoskeletal:        Foot pain          OBJECTIVE    Pulse 58   Ht 177.8 cm (70\")   Wt 81.2 kg (179 lb)   SpO2 98%   BMI 25.68 kg/m²     Physical Exam  Vitals reviewed.   Constitutional:       Appearance: Normal appearance. He is well-developed.   HENT:      Head: Normocephalic and atraumatic.   Neck:      Trachea: Trachea and phonation normal.   Cardiovascular:      Pulses:           Dorsalis pedis pulses are 1+ on the right side and 1+ on the left side.        Posterior tibial pulses are 1+ on the right side and 1+ on the left side.   Pulmonary:      Effort: Pulmonary effort is normal. No respiratory distress.   Abdominal:      General: There is no distension.      Palpations: Abdomen is soft.   Feet:      Right foot:      Protective Sensation: 10 sites tested. 10 sites sensed.      Skin integrity: Erythema and dry skin present.      Toenail Condition: Right toenails are abnormally thick.      Left foot:      Protective Sensation: 10 sites tested. 10 sites sensed.      Skin integrity: Erythema and dry skin present.      Toenail Condition: Left toenails are abnormally thick.   Skin:     General: Skin is warm and dry.   Neurological:      Mental Status: He is alert and oriented to person, place, and time.      GCS: GCS eye subscore is 4. GCS verbal subscore is 5. GCS motor subscore is 6. "   Psychiatric:         Speech: Speech normal.         Behavior: Behavior normal. Behavior is cooperative.         Thought Content: Thought content normal.         Judgment: Judgment normal.                Procedures        ASSESSMENT AND PLAN    Diagnoses and all orders for this visit:    1. Tinea pedis of both feet (Primary)  -     Hepatic Function Panel; Future    Other orders  -     clotrimazole-betamethasone (LOTRISONE) 1-0.05 % cream; Apply 1 application topically to the appropriate area as directed 2 (Two) Times a Day. Apply to affected area twice daily  Dispense: 45 g; Refill: 1      Had a long discussion with the patient concerning treatment options of tinea pedis.  At this point he is ready to proceed with Lamisil dosing once we have established that his liver function is adequate.  Lab orders were sent with the patient and I refilled his Lotrisone cream to use by twice daily.  He was given some recommendations on different lotions to apply the feet as well to help with his dry skin.  Verbalized understand plan of care and will follow-up in 1 month for recheck unless symptoms worsen          This document has been electronically signed by ROBERTO Sandhu, ONP-C on February 14, 2023 11:38 CST

## 2023-02-20 ENCOUNTER — TELEPHONE (OUTPATIENT)
Dept: PODIATRY | Facility: CLINIC | Age: 83
End: 2023-02-20
Payer: MEDICARE

## 2023-02-20 DIAGNOSIS — B35.3 TINEA PEDIS OF BOTH FEET: Primary | ICD-10-CM

## 2023-02-20 RX ORDER — TERBINAFINE HYDROCHLORIDE 250 MG/1
250 TABLET ORAL DAILY
Qty: 30 TABLET | Refills: 0 | Status: SHIPPED | OUTPATIENT
Start: 2023-02-20

## 2023-02-20 NOTE — TELEPHONE ENCOUNTER
PATIENT REQUEST A CALL BACK FOR LAB RESULTS AND IF HE CAN START TAKING HIS MEDICATION.   [phone]

## 2023-03-30 ENCOUNTER — TELEPHONE (OUTPATIENT)
Dept: PODIATRY | Facility: CLINIC | Age: 83
End: 2023-03-30
Payer: MEDICARE

## 2023-03-30 NOTE — TELEPHONE ENCOUNTER
PT WIFE REQUESTS A CALL BACK RE WANTS TO KNOW IF PT NEEDS TO HAVE LAB WORK DONE BEFORE HIS APPOINTMENT TOMORROW WITH JASON.  CALL BACK # [phone].  THANK YOU.

## 2023-03-31 ENCOUNTER — OFFICE VISIT (OUTPATIENT)
Dept: PODIATRY | Facility: CLINIC | Age: 83
End: 2023-03-31
Payer: MEDICARE

## 2023-03-31 ENCOUNTER — LAB (OUTPATIENT)
Dept: LAB | Facility: HOSPITAL | Age: 83
End: 2023-03-31
Payer: MEDICARE

## 2023-03-31 VITALS — OXYGEN SATURATION: 94 % | BODY MASS INDEX: 25.62 KG/M2 | HEIGHT: 70 IN | WEIGHT: 179 LBS | HEART RATE: 78 BPM

## 2023-03-31 DIAGNOSIS — M79.674 PAIN DUE TO ONYCHOMYCOSIS OF TOENAILS OF BOTH FEET: ICD-10-CM

## 2023-03-31 DIAGNOSIS — B35.3 TINEA PEDIS OF BOTH FEET: Primary | ICD-10-CM

## 2023-03-31 DIAGNOSIS — M79.675 PAIN DUE TO ONYCHOMYCOSIS OF TOENAILS OF BOTH FEET: ICD-10-CM

## 2023-03-31 DIAGNOSIS — E11.649 TYPE 2 DIABETES MELLITUS WITH HYPOGLYCEMIA WITHOUT COMA, WITHOUT LONG-TERM CURRENT USE OF INSULIN: ICD-10-CM

## 2023-03-31 DIAGNOSIS — B35.1 PAIN DUE TO ONYCHOMYCOSIS OF TOENAILS OF BOTH FEET: ICD-10-CM

## 2023-03-31 DIAGNOSIS — B35.3 TINEA PEDIS OF BOTH FEET: ICD-10-CM

## 2023-03-31 DIAGNOSIS — B35.1 ONYCHOMYCOSIS: ICD-10-CM

## 2023-03-31 LAB
ALBUMIN SERPL-MCNC: 4 G/DL (ref 3.5–5.2)
ALP SERPL-CCNC: 70 U/L (ref 39–117)
ALT SERPL W P-5'-P-CCNC: 26 U/L (ref 1–41)
AST SERPL-CCNC: 31 U/L (ref 1–40)
BILIRUB CONJ SERPL-MCNC: 0.2 MG/DL (ref 0–0.3)
BILIRUB INDIRECT SERPL-MCNC: 0.4 MG/DL
BILIRUB SERPL-MCNC: 0.6 MG/DL (ref 0–1.2)
PROT SERPL-MCNC: 6.7 G/DL (ref 6–8.5)

## 2023-03-31 PROCEDURE — 11721 DEBRIDE NAIL 6 OR MORE: CPT | Performed by: NURSE PRACTITIONER

## 2023-03-31 PROCEDURE — 80076 HEPATIC FUNCTION PANEL: CPT

## 2023-03-31 PROCEDURE — 99212 OFFICE O/P EST SF 10 MIN: CPT | Performed by: NURSE PRACTITIONER

## 2023-03-31 PROCEDURE — 36415 COLL VENOUS BLD VENIPUNCTURE: CPT

## 2023-03-31 PROCEDURE — 11750 EXCISION NAIL&NAIL MATRIX: CPT | Performed by: NURSE PRACTITIONER

## 2023-03-31 NOTE — PROGRESS NOTES
Amol Hudson  1940  82 y.o. male  PCP: Jay Jay Beth   BS: 116 per patient     3/31/2023    Chief Complaint   Patient presents with   • Right Foot - Rash   • Left Foot - Nail Problem       History of Present Illness    Amol Hudson is a 82 y.o.male came to clinic for a follow up appointment on right foot Tinea Pedis and has concern on left hallux nail causing pain.     82-year-old gentleman in the office today for an evaluation of a rash on both feet.  He has been evaluated by his primary care provider who started him on some antifungal creams and steroids none of which seem to completely resolve the symptoms but he reports that he has not been consistently using them.      Past Medical History:   Diagnosis Date   • Diabetes (HCC)    • Diabetic neuritis (HCC)    • Enlarged prostate    • GERD (gastroesophageal reflux disease)    • HTN (hypertension)    • Hyperlipemia    • Hypothyroid          Past Surgical History:   Procedure Laterality Date   • ANGIOPLASTY     • CARDIAC CATHETERIZATION Left 11/2/2021    Procedure: Left Heart Cath;  Surgeon: Jerod Del Cid MD;  Location: [facility] INVASIVE LOCATION;  Service: Cardiology;  Laterality: Left;   • CARPAL TUNNEL RELEASE Bilateral    • ROTATOR CUFF REPAIR Left          Family History   Problem Relation Age of Onset   • Heart disease Other    • Hypertension Other    • Diabetes Other    • Skin cancer Other        No Known Allergies    Social History     Socioeconomic History   • Marital status:    Tobacco Use   • Smoking status: Former   • Smokeless tobacco: Never   Substance and Sexual Activity   • Sexual activity: Defer         Current Outpatient Medications   Medication Sig Dispense Refill   • aspirin 81 MG chewable tablet Chew 1 tablet Daily.     • atorvastatin (LIPITOR) 80 MG tablet Take 1 tablet by mouth Daily.     • clotrimazole-betamethasone (LOTRISONE) 1-0.05 % cream Apply 1 application topically to the appropriate area as directed 2 (Two)  "Times a Day. Apply to affected area twice daily 45 g 1   • gabapentin (NEURONTIN) 300 MG capsule Take 1 capsule by mouth Daily.     • levothyroxine (SYNTHROID, LEVOTHROID) 50 MCG tablet Take 1 tablet by mouth Daily.     • lisinopril (PRINIVIL,ZESTRIL) 5 MG tablet Take 1 tablet by mouth Daily.     • metFORMIN (GLUCOPHAGE) 500 MG tablet Take 1 tablet by mouth 2 (Two) Times a Day With Meals.     • multivitamin with minerals tablet tablet Take 1 tablet by mouth Daily.     • omeprazole (priLOSEC) 20 MG capsule Take 1 capsule by mouth Daily.     • tamsulosin (FLOMAX) 0.4 MG capsule 24 hr capsule Take 1 capsule by mouth Daily.     • terbinafine (LamISIL) 250 MG tablet Take 1 tablet by mouth Daily. (Patient not taking: Reported on 3/31/2023) 30 tablet 0     No current facility-administered medications for this visit.       Review of Systems   Musculoskeletal:        Foot pain          OBJECTIVE    Pulse 78   Ht 177.8 cm (70\")   Wt 81.2 kg (179 lb)   SpO2 94%   BMI 25.68 kg/m²     Physical Exam  Vitals reviewed.   Constitutional:       Appearance: Normal appearance. He is well-developed.   HENT:      Head: Normocephalic and atraumatic.   Neck:      Trachea: Trachea and phonation normal.   Cardiovascular:      Pulses:           Dorsalis pedis pulses are 1+ on the right side and 1+ on the left side.        Posterior tibial pulses are 1+ on the right side and 1+ on the left side.   Pulmonary:      Effort: Pulmonary effort is normal. No respiratory distress.   Abdominal:      General: There is no distension.      Palpations: Abdomen is soft.   Musculoskeletal:        Feet:    Feet:      Right foot:      Protective Sensation: 10 sites tested. 10 sites sensed.      Skin integrity: No erythema or dry skin.      Toenail Condition: Right toenails are abnormally thick and long. Fungal disease present.     Left foot:      Protective Sensation: 10 sites tested. 10 sites sensed.      Skin integrity: No erythema or dry skin.      " Toenail Condition: Left toenails are abnormally thick and long. Fungal disease present.  Skin:     General: Skin is warm and dry.   Neurological:      Mental Status: He is alert and oriented to person, place, and time.      GCS: GCS eye subscore is 4. GCS verbal subscore is 5. GCS motor subscore is 6.   Psychiatric:         Speech: Speech normal.         Behavior: Behavior normal. Behavior is cooperative.         Thought Content: Thought content normal.         Judgment: Judgment normal.                Nail Removal,left hallux     Date/Time: 3/31/2023 2:11 PM  Performed by: Lenny Freed APRN  Authorized by: Lenny Freed APRN   Consent: Written consent obtained.  Consent given by: patient  Anesthesia: local infiltration    Anesthesia:  Local Anesthetic: lidocaine 1% without epinephrine  Preparation: skin prepped with Betadine  Amount removed: complete  Nail matrix removed: complete  Dressing: antibiotic ointment and dressing applied  Patient tolerance: patient tolerated the procedure well with no immediate complications              ASSESSMENT AND PLAN    Diagnoses and all orders for this visit:    1. Tinea pedis of both feet (Primary)    2. Onychomycosis    3. Pain due to onychomycosis of toenails of both feet    4. Type 2 diabetes mellitus with hypoglycemia without coma, without long-term current use of insulin (HCC)      After risk benefits and treatment options were discussed with the patient in detail.  Proceeded with removal of the hallux nail left side without difficulty.  Patient tolerated well and was given post procedure instructions following the removal.  Nails were trimmed as well.    - A diabetic foot screening exam was performed and the patient was educated on the foot complications related to diabetes,  preventative foot care and what signs and symptoms to watch for.  Instructed to contact our office if any foot problems develop before next visit.  -Discussed treatments for  painful  toenails. Treatment options discussed included nail removal versus debridement. Patient elected for routine nail debridement. An ABN has been signed by the patient.  - Toenails 1-5  bilateral were debrided in length and thickness with nail nipper and electric  to decrease fungal load and risk of infection.  - All the patients questions were answered.  - RTC 3 months or sooner if needed.           This document has been electronically signed by Lenny SALGADO, FNP-C, ONP-C on March 31, 2023 14:10 CDT

## 2023-04-03 ENCOUNTER — TELEPHONE (OUTPATIENT)
Dept: PODIATRY | Facility: CLINIC | Age: 83
End: 2023-04-03
Payer: MEDICARE

## 2023-04-03 NOTE — TELEPHONE ENCOUNTER
JANICE Sandoval is not sure about how to care for the toenail removal that he had.  Please call and advise  ty    [phone]

## 2023-04-03 NOTE — TELEPHONE ENCOUNTER
Informed patient that his needed to soak his toe twice daily and apply bacitracin an a band-aid until his follow up appointment.

## 2023-04-13 ENCOUNTER — TELEPHONE (OUTPATIENT)
Dept: PODIATRY | Facility: CLINIC | Age: 83
End: 2023-04-13
Payer: MEDICARE

## 2023-04-13 NOTE — TELEPHONE ENCOUNTER
ramila    Mr Hudosn wants to know if it would be ok if he plays golf on 4/14  He states his toe where he had his nail removed is doing ok,  Its a little red but he is doing fine. He said if he is needed to just come in and ramila take a look at it he can do that    [phone]

## 2023-04-14 NOTE — TELEPHONE ENCOUNTER
Patient has been told he can go golf but he needs a follow up to recheck  nail. Appointment was made and canceled same day.

## 2023-04-20 ENCOUNTER — OFFICE VISIT (OUTPATIENT)
Dept: PODIATRY | Facility: CLINIC | Age: 83
End: 2023-04-20
Payer: MEDICARE

## 2023-04-20 VITALS — BODY MASS INDEX: 25.62 KG/M2 | WEIGHT: 179 LBS | HEART RATE: 84 BPM | HEIGHT: 70 IN | OXYGEN SATURATION: 99 %

## 2023-04-20 DIAGNOSIS — B35.1 ONYCHOMYCOSIS: ICD-10-CM

## 2023-04-20 DIAGNOSIS — M79.674 PAIN DUE TO ONYCHOMYCOSIS OF TOENAILS OF BOTH FEET: ICD-10-CM

## 2023-04-20 DIAGNOSIS — B35.3 TINEA PEDIS OF BOTH FEET: Primary | ICD-10-CM

## 2023-04-20 DIAGNOSIS — B35.1 PAIN DUE TO ONYCHOMYCOSIS OF TOENAILS OF BOTH FEET: ICD-10-CM

## 2023-04-20 DIAGNOSIS — M79.675 PAIN DUE TO ONYCHOMYCOSIS OF TOENAILS OF BOTH FEET: ICD-10-CM

## 2023-04-20 PROCEDURE — 99212 OFFICE O/P EST SF 10 MIN: CPT | Performed by: NURSE PRACTITIONER

## 2023-04-20 NOTE — PROGRESS NOTES
Amol Hudson  1940  82 y.o. male  PCP: Jay Jay Beth   BS: 116 per patient     04/20/2023    Chief Complaint   Patient presents with   • Left Foot - Pain, Follow-up       History of Present Illness    Amol Hudson is a 82 y.o.male came to clinic for a follow up appointment for left hallux nail removal       Past Medical History:   Diagnosis Date   • Diabetes    • Diabetic neuritis    • Enlarged prostate    • GERD (gastroesophageal reflux disease)    • HTN (hypertension)    • Hyperlipemia    • Hypothyroid          Past Surgical History:   Procedure Laterality Date   • ANGIOPLASTY     • CARDIAC CATHETERIZATION Left 11/2/2021    Procedure: Left Heart Cath;  Surgeon: Jerod Del Cid MD;  Location: [facility] CATH INVASIVE LOCATION;  Service: Cardiology;  Laterality: Left;   • CARPAL TUNNEL RELEASE Bilateral    • ROTATOR CUFF REPAIR Left          Family History   Problem Relation Age of Onset   • Heart disease Other    • Hypertension Other    • Diabetes Other    • Skin cancer Other        No Known Allergies    Social History     Socioeconomic History   • Marital status:    Tobacco Use   • Smoking status: Former   • Smokeless tobacco: Never   Substance and Sexual Activity   • Sexual activity: Defer         Current Outpatient Medications   Medication Sig Dispense Refill   • aspirin 81 MG chewable tablet Chew 1 tablet Daily.     • atorvastatin (LIPITOR) 80 MG tablet Take 1 tablet by mouth Daily.     • clotrimazole-betamethasone (LOTRISONE) 1-0.05 % cream Apply 1 application topically to the appropriate area as directed 2 (Two) Times a Day. Apply to affected area twice daily 45 g 1   • gabapentin (NEURONTIN) 300 MG capsule Take 1 capsule by mouth Daily.     • levothyroxine (SYNTHROID, LEVOTHROID) 50 MCG tablet Take 1 tablet by mouth Daily.     • lisinopril (PRINIVIL,ZESTRIL) 5 MG tablet Take 1 tablet by mouth Daily.     • metFORMIN (GLUCOPHAGE) 500 MG tablet Take 1 tablet by mouth 2 (Two) Times a Day With  "Meals.     • multivitamin with minerals tablet tablet Take 1 tablet by mouth Daily.     • omeprazole (priLOSEC) 20 MG capsule Take 1 capsule by mouth Daily.     • tamsulosin (FLOMAX) 0.4 MG capsule 24 hr capsule Take 1 capsule by mouth Daily.     • terbinafine (LamISIL) 250 MG tablet Take 1 tablet by mouth Daily. 30 tablet 0     No current facility-administered medications for this visit.       Review of Systems   Musculoskeletal:        Foot pain          OBJECTIVE    Pulse 84   Ht 177.8 cm (70\")   Wt 81.2 kg (179 lb)   SpO2 99%   BMI 25.68 kg/m²     Physical Exam  Vitals reviewed.   Constitutional:       Appearance: Normal appearance. He is well-developed.   HENT:      Head: Normocephalic and atraumatic.   Neck:      Trachea: Trachea and phonation normal.   Cardiovascular:      Pulses:           Dorsalis pedis pulses are 1+ on the right side and 1+ on the left side.        Posterior tibial pulses are 1+ on the right side and 1+ on the left side.   Pulmonary:      Effort: Pulmonary effort is normal. No respiratory distress.   Abdominal:      General: There is no distension.      Palpations: Abdomen is soft.   Musculoskeletal:        Feet:    Feet:      Right foot:      Protective Sensation: 10 sites tested. 10 sites sensed.      Skin integrity: No erythema or dry skin.      Toenail Condition: Right toenails are abnormally thick and long. Fungal disease present.     Left foot:      Protective Sensation: 10 sites tested. 10 sites sensed.      Skin integrity: No erythema or dry skin.      Toenail Condition: Left toenails are abnormally thick and long. Fungal disease present.  Skin:     General: Skin is warm and dry.   Neurological:      Mental Status: He is alert and oriented to person, place, and time.      GCS: GCS eye subscore is 4. GCS verbal subscore is 5. GCS motor subscore is 6.   Psychiatric:         Speech: Speech normal.         Behavior: Behavior normal. Behavior is cooperative.         Thought Content: " Thought content normal.         Judgment: Judgment normal.                Procedures        ASSESSMENT AND PLAN    Diagnoses and all orders for this visit:    1. Tinea pedis of both feet (Primary)    2. Onychomycosis    3. Pain due to onychomycosis of toenails of both feet      Looks good  F/u as needed for worsening symptoms          This document has been electronically signed by Lenny SALGADO, FNP-C, ONP-C on April 20, 2023 11:08 CDT

## 2023-05-01 ENCOUNTER — OFFICE VISIT (OUTPATIENT)
Dept: PODIATRY | Facility: CLINIC | Age: 83
End: 2023-05-01
Payer: MEDICARE

## 2023-05-01 VITALS — OXYGEN SATURATION: 96 % | BODY MASS INDEX: 25.62 KG/M2 | HEIGHT: 70 IN | WEIGHT: 179 LBS | HEART RATE: 63 BPM

## 2023-05-01 DIAGNOSIS — E11.649 TYPE 2 DIABETES MELLITUS WITH HYPOGLYCEMIA WITHOUT COMA, WITHOUT LONG-TERM CURRENT USE OF INSULIN: ICD-10-CM

## 2023-05-01 DIAGNOSIS — B35.1 ONYCHOMYCOSIS: Primary | ICD-10-CM

## 2023-05-01 DIAGNOSIS — B35.3 TINEA PEDIS OF BOTH FEET: ICD-10-CM

## 2023-05-01 PROCEDURE — 99212 OFFICE O/P EST SF 10 MIN: CPT | Performed by: NURSE PRACTITIONER

## 2023-05-01 NOTE — PROGRESS NOTES
Amol Hudson  1940  82 y.o. male  PCP: Jay Jay Beth   BS: 116 per patient     5/1/2023    Chief Complaint   Patient presents with   • Left Foot - Follow-up       History of Present Illness    Amol Hudson is a 82 y.o.male came to clinic for a follow up appointment for left hallux nail removal.      Past Medical History:   Diagnosis Date   • Diabetes    • Diabetic neuritis    • Enlarged prostate    • GERD (gastroesophageal reflux disease)    • HTN (hypertension)    • Hyperlipemia    • Hypothyroid          Past Surgical History:   Procedure Laterality Date   • ANGIOPLASTY     • CARDIAC CATHETERIZATION Left 11/2/2021    Procedure: Left Heart Cath;  Surgeon: Jerod Del Cid MD;  Location: [facility] CATH INVASIVE LOCATION;  Service: Cardiology;  Laterality: Left;   • CARPAL TUNNEL RELEASE Bilateral    • ROTATOR CUFF REPAIR Left          Family History   Problem Relation Age of Onset   • Heart disease Other    • Hypertension Other    • Diabetes Other    • Skin cancer Other        No Known Allergies    Social History     Socioeconomic History   • Marital status:    Tobacco Use   • Smoking status: Former   • Smokeless tobacco: Never   Substance and Sexual Activity   • Sexual activity: Defer         Current Outpatient Medications   Medication Sig Dispense Refill   • aspirin 81 MG chewable tablet Chew 1 tablet Daily.     • atorvastatin (LIPITOR) 80 MG tablet Take 1 tablet by mouth Daily.     • clotrimazole-betamethasone (LOTRISONE) 1-0.05 % cream Apply 1 application topically to the appropriate area as directed 2 (Two) Times a Day. Apply to affected area twice daily 45 g 1   • gabapentin (NEURONTIN) 300 MG capsule Take 1 capsule by mouth Daily.     • levothyroxine (SYNTHROID, LEVOTHROID) 50 MCG tablet Take 1 tablet by mouth Daily.     • lisinopril (PRINIVIL,ZESTRIL) 5 MG tablet Take 1 tablet by mouth Daily.     • metFORMIN (GLUCOPHAGE) 500 MG tablet Take 1 tablet by mouth 2 (Two) Times a Day With Meals.     •  "multivitamin with minerals tablet tablet Take 1 tablet by mouth Daily.     • omeprazole (priLOSEC) 20 MG capsule Take 1 capsule by mouth Daily.     • tamsulosin (FLOMAX) 0.4 MG capsule 24 hr capsule Take 1 capsule by mouth Daily.     • terbinafine (LamISIL) 250 MG tablet Take 1 tablet by mouth Daily. 30 tablet 0     No current facility-administered medications for this visit.       Review of Systems   Musculoskeletal:        Foot pain          OBJECTIVE    Pulse 63   Ht 177.8 cm (70\")   Wt 81.2 kg (179 lb)   SpO2 96%   BMI 25.68 kg/m²     Physical Exam  Vitals reviewed.   Constitutional:       Appearance: Normal appearance. He is well-developed.   HENT:      Head: Normocephalic and atraumatic.   Neck:      Trachea: Trachea and phonation normal.   Cardiovascular:      Pulses:           Dorsalis pedis pulses are 1+ on the right side and 1+ on the left side.        Posterior tibial pulses are 1+ on the right side and 1+ on the left side.   Pulmonary:      Effort: Pulmonary effort is normal. No respiratory distress.   Abdominal:      General: There is no distension.      Palpations: Abdomen is soft.   Musculoskeletal:        Feet:    Feet:      Right foot:      Protective Sensation: 10 sites tested. 10 sites sensed.      Skin integrity: No erythema or dry skin.      Toenail Condition: Right toenails are abnormally thick and long. Fungal disease present.     Left foot:      Protective Sensation: 10 sites tested. 10 sites sensed.      Skin integrity: No erythema or dry skin.      Toenail Condition: Left toenails are abnormally thick and long. Fungal disease present.  Skin:     General: Skin is warm and dry.   Neurological:      Mental Status: He is alert and oriented to person, place, and time.      GCS: GCS eye subscore is 4. GCS verbal subscore is 5. GCS motor subscore is 6.   Psychiatric:         Speech: Speech normal.         Behavior: Behavior normal. Behavior is cooperative.         Thought Content: Thought " content normal.         Judgment: Judgment normal.                Procedures        ASSESSMENT AND PLAN    Diagnoses and all orders for this visit:    1. Onychomycosis (Primary)    2. Tinea pedis of both feet    3. Type 2 diabetes mellitus with hypoglycemia without coma, without long-term current use of insulin         Reassured patient to continue current treatment modalities  Follow-up in 1 month for recheck unless symptoms worsen          This document has been electronically signed by Lenny SALGADO FNP-C, ONP-C on May 1, 2023 13:43 CDT

## 2024-05-22 ENCOUNTER — HOSPITAL ENCOUNTER (OUTPATIENT)
Age: 84
Setting detail: SPECIMEN
Discharge: HOME | End: 2024-05-22
Payer: MEDICARE

## 2024-05-22 PROCEDURE — 88305 TISSUE EXAM BY PATHOLOGIST: CPT

## (undated) DEVICE — A2000 MULTI-USE SYRINGE KIT, P/N 701277-003KIT CONTENTS: 100ML CONTRAST RESERVOIR AND TUBING WITH CONTRAST SPIKE AND CLAMP: Brand: A2000 MULTI-USE SYRINGE KIT

## (undated) DEVICE — MODEL AT P65, P/N 701554-001KIT CONTENTS: HAND CONTROLLER, 3-WAY HIGH-PRESSURE STOPCOCK WITH ROTATING END AND PREMIUM HIGH-PRESSURE TUBING: Brand: ANGIOTOUCH® KIT

## (undated) DEVICE — KT INTRO MINISTICK MAX W/GW NITNL/TUNG ECHO 4F 21G 7CM

## (undated) DEVICE — GW PERIPH GUIDERIGHT STD/J/TP PTFE/PCOAT SS 0.038IN 5X150CM

## (undated) DEVICE — ANGIO-SEAL VIP VASCULAR CLOSURE DEVICE: Brand: ANGIO-SEAL

## (undated) DEVICE — INTRO SHEATH ART/FEM ENGAGE .038 6F12CM

## (undated) DEVICE — MODEL BT2000 P/N 700287-012KIT CONTENTS: MANIFOLD WITH SALINE AND CONTRAST PORTS, SALINE TUBING WITH SPIKE AND HAND SYRINGE, TRANSDUCER: Brand: BT2000 AUTOMATED MANIFOLD KIT

## (undated) DEVICE — CATH DIAG EXPO M/ PK 6FR FL4/FR4 PIG 3PK

## (undated) DEVICE — ELECTRODE,RT,STRESS,FOAM,50PK: Brand: MEDLINE

## (undated) DEVICE — PK CATH LAB 60